# Patient Record
Sex: MALE | Race: BLACK OR AFRICAN AMERICAN | NOT HISPANIC OR LATINO | Employment: FULL TIME | URBAN - METROPOLITAN AREA
[De-identification: names, ages, dates, MRNs, and addresses within clinical notes are randomized per-mention and may not be internally consistent; named-entity substitution may affect disease eponyms.]

---

## 2018-06-07 ENCOUNTER — OFFICE VISIT (OUTPATIENT)
Dept: FAMILY MEDICINE | Facility: CLINIC | Age: 33
End: 2018-06-07
Payer: COMMERCIAL

## 2018-06-07 VITALS
BODY MASS INDEX: 22.83 KG/M2 | SYSTOLIC BLOOD PRESSURE: 106 MMHG | TEMPERATURE: 98 F | HEIGHT: 70 IN | HEART RATE: 51 BPM | DIASTOLIC BLOOD PRESSURE: 66 MMHG | WEIGHT: 159.5 LBS | OXYGEN SATURATION: 97 %

## 2018-06-07 DIAGNOSIS — H61.20 IMPACTED CERUMEN, UNSPECIFIED LATERALITY: Primary | ICD-10-CM

## 2018-06-07 PROCEDURE — 3008F BODY MASS INDEX DOCD: CPT | Mod: CPTII,S$GLB,, | Performed by: FAMILY MEDICINE

## 2018-06-07 PROCEDURE — 99999 PR PBB SHADOW E&M-NEW PATIENT-LVL III: CPT | Mod: PBBFAC,,, | Performed by: FAMILY MEDICINE

## 2018-06-07 PROCEDURE — 99202 OFFICE O/P NEW SF 15 MIN: CPT | Mod: S$GLB,,, | Performed by: FAMILY MEDICINE

## 2018-06-07 NOTE — PROGRESS NOTES
[facility]  Progress Note    SUBJECTIVE:     Chief Complaint   Patient presents with    [facility]    Ear Fullness       HPI   Cale Steiner  is a 32 y.o. male here to [facility] and for c/o left ear fullness. He is having muffled sounds for the past week. No fevers, chills, discharge.     Review of patient's allergies indicates:  No Known Allergies    History reviewed. No pertinent past medical history.  Past Surgical History:   Procedure Laterality Date    thumb surgery       History reviewed. No pertinent family history.  Social History   Substance Use Topics    Smoking status: Never Smoker    Smokeless tobacco: Never Used    Alcohol use No        Review of Systems   Constitutional: Negative for chills and fever.   HENT: Positive for hearing loss (L ear, muffled sound). Negative for ear discharge, ear pain and tinnitus.    Eyes: Negative for blurred vision.   Respiratory: Negative for cough.    Neurological: Negative for headaches.     OBJECTIVE:     Vitals:    06/07/18 0816   BP: 106/66   Pulse: (!) 51   Temp: 98 °F (36.7 °C)     Body mass index is 22.88 kg/m².    Physical Exam   Constitutional: He is oriented to person, place, and time and well-developed, well-nourished, and in no distress. No distress.   HENT:   Head: Normocephalic and atraumatic.   + impacted cerumen in both ears   Eyes: Conjunctivae and EOM are normal.   Cardiovascular: Bradycardia present.    Pulmonary/Chest: Effort normal. No respiratory distress.   Neurological: He is alert and oriented to person, place, and time.   Skin: Skin is warm. He is not diaphoretic.       Old records were reviewed. Labs and/or images were independently reviewed.    ASSESSMENT     1. Impacted cerumen, unspecified laterality        PLAN:     Impacted cerumen, unspecified laterality   -       Irrigated both ears. Complete relief. Will return for physical.    RTC DEMETRICE Santamaira MD  06/07/2018 1:33 PM

## 2018-06-14 ENCOUNTER — TELEPHONE (OUTPATIENT)
Dept: FAMILY MEDICINE | Facility: CLINIC | Age: 33
End: 2018-06-14

## 2018-06-14 DIAGNOSIS — Z23 NEED FOR TDAP VACCINATION: ICD-10-CM

## 2018-06-14 DIAGNOSIS — Z00.00 ROUTINE GENERAL MEDICAL EXAMINATION AT A HEALTH CARE FACILITY: Primary | ICD-10-CM

## 2018-06-14 NOTE — TELEPHONE ENCOUNTER
----- Message from Nayely Shultz sent at 6/14/2018  4:00 PM CDT -----  Contact: self  Pt received a message asking him to schedule fasting labs and Tdap. He states he will complete this when he comes in to appt on 06/21.

## 2018-06-21 ENCOUNTER — OFFICE VISIT (OUTPATIENT)
Dept: FAMILY MEDICINE | Facility: CLINIC | Age: 33
End: 2018-06-21
Payer: COMMERCIAL

## 2018-06-21 ENCOUNTER — LAB VISIT (OUTPATIENT)
Dept: LAB | Facility: HOSPITAL | Age: 33
End: 2018-06-21
Attending: FAMILY MEDICINE
Payer: COMMERCIAL

## 2018-06-21 VITALS
DIASTOLIC BLOOD PRESSURE: 72 MMHG | HEART RATE: 48 BPM | TEMPERATURE: 98 F | SYSTOLIC BLOOD PRESSURE: 122 MMHG | HEIGHT: 70 IN | OXYGEN SATURATION: 97 % | WEIGHT: 160.63 LBS | BODY MASS INDEX: 23 KG/M2

## 2018-06-21 DIAGNOSIS — Z00.00 ROUTINE PHYSICAL EXAMINATION: Primary | ICD-10-CM

## 2018-06-21 DIAGNOSIS — Z00.00 ROUTINE GENERAL MEDICAL EXAMINATION AT A HEALTH CARE FACILITY: ICD-10-CM

## 2018-06-21 DIAGNOSIS — Z00.00 ROUTINE PHYSICAL EXAMINATION: ICD-10-CM

## 2018-06-21 DIAGNOSIS — Z23 NEED FOR TDAP VACCINATION: ICD-10-CM

## 2018-06-21 LAB
ALBUMIN SERPL BCP-MCNC: 4.4 G/DL
ALP SERPL-CCNC: 65 U/L
ALT SERPL W/O P-5'-P-CCNC: 18 U/L
ANION GAP SERPL CALC-SCNC: 7 MMOL/L
AST SERPL-CCNC: 21 U/L
BASOPHILS # BLD AUTO: 0.02 K/UL
BASOPHILS NFR BLD: 0.6 %
BILIRUB SERPL-MCNC: 1.5 MG/DL
BUN SERPL-MCNC: 14 MG/DL
CALCIUM SERPL-MCNC: 9.9 MG/DL
CHLORIDE SERPL-SCNC: 105 MMOL/L
CHOLEST SERPL-MCNC: 180 MG/DL
CHOLEST/HDLC SERPL: 2.6 {RATIO}
CO2 SERPL-SCNC: 28 MMOL/L
CREAT SERPL-MCNC: 1.3 MG/DL
DIFFERENTIAL METHOD: ABNORMAL
EOSINOPHIL # BLD AUTO: 0.2 K/UL
EOSINOPHIL NFR BLD: 5.6 %
ERYTHROCYTE [DISTWIDTH] IN BLOOD BY AUTOMATED COUNT: 12.7 %
EST. GFR  (AFRICAN AMERICAN): >60 ML/MIN/1.73 M^2
EST. GFR  (NON AFRICAN AMERICAN): >60 ML/MIN/1.73 M^2
ESTIMATED AVG GLUCOSE: 108 MG/DL
GLUCOSE SERPL-MCNC: 95 MG/DL
HBA1C MFR BLD HPLC: 5.4 %
HCT VFR BLD AUTO: 47 %
HDLC SERPL-MCNC: 69 MG/DL
HDLC SERPL: 38.3 %
HGB BLD-MCNC: 15.5 G/DL
IMM GRANULOCYTES # BLD AUTO: 0.01 K/UL
IMM GRANULOCYTES NFR BLD AUTO: 0.3 %
LDLC SERPL CALC-MCNC: 101 MG/DL
LYMPHOCYTES # BLD AUTO: 1.5 K/UL
LYMPHOCYTES NFR BLD: 41.7 %
MCH RBC QN AUTO: 30.9 PG
MCHC RBC AUTO-ENTMCNC: 33 G/DL
MCV RBC AUTO: 94 FL
MONOCYTES # BLD AUTO: 0.4 K/UL
MONOCYTES NFR BLD: 11.3 %
NEUTROPHILS # BLD AUTO: 1.4 K/UL
NEUTROPHILS NFR BLD: 40.5 %
NONHDLC SERPL-MCNC: 111 MG/DL
NRBC BLD-RTO: 0 /100 WBC
PLATELET # BLD AUTO: 233 K/UL
PMV BLD AUTO: 10.6 FL
POTASSIUM SERPL-SCNC: 4.2 MMOL/L
PROT SERPL-MCNC: 7.5 G/DL
RBC # BLD AUTO: 5.02 M/UL
SODIUM SERPL-SCNC: 140 MMOL/L
T4 FREE SERPL-MCNC: 0.97 NG/DL
TRIGL SERPL-MCNC: 50 MG/DL
TSH SERPL DL<=0.005 MIU/L-ACNC: 1.89 UIU/ML
WBC # BLD AUTO: 3.55 K/UL

## 2018-06-21 PROCEDURE — 80061 LIPID PANEL: CPT

## 2018-06-21 PROCEDURE — 99999 PR PBB SHADOW E&M-EST. PATIENT-LVL III: CPT | Mod: PBBFAC,,, | Performed by: FAMILY MEDICINE

## 2018-06-21 PROCEDURE — 84439 ASSAY OF FREE THYROXINE: CPT

## 2018-06-21 PROCEDURE — 80053 COMPREHEN METABOLIC PANEL: CPT

## 2018-06-21 PROCEDURE — 99395 PREV VISIT EST AGE 18-39: CPT | Mod: 25,S$GLB,, | Performed by: FAMILY MEDICINE

## 2018-06-21 PROCEDURE — 86703 HIV-1/HIV-2 1 RESULT ANTBDY: CPT

## 2018-06-21 PROCEDURE — 80074 ACUTE HEPATITIS PANEL: CPT

## 2018-06-21 PROCEDURE — 36415 COLL VENOUS BLD VENIPUNCTURE: CPT | Mod: PO

## 2018-06-21 PROCEDURE — 84443 ASSAY THYROID STIM HORMONE: CPT

## 2018-06-21 PROCEDURE — 85025 COMPLETE CBC W/AUTO DIFF WBC: CPT

## 2018-06-21 PROCEDURE — 83036 HEMOGLOBIN GLYCOSYLATED A1C: CPT

## 2018-06-21 PROCEDURE — 90715 TDAP VACCINE 7 YRS/> IM: CPT | Mod: S$GLB,,, | Performed by: FAMILY MEDICINE

## 2018-06-21 PROCEDURE — 90471 IMMUNIZATION ADMIN: CPT | Mod: S$GLB,,, | Performed by: FAMILY MEDICINE

## 2018-06-21 NOTE — PROGRESS NOTES
[facility]  Progress Note    SUBJECTIVE:     Chief Complaint   Patient presents with    Annual Exam       HPI   Cale Steiner  is a 32 y.o. male here for annual exam. Patient has no other new complaints/problems at this time.      Review of patient's allergies indicates:  No Known Allergies    History reviewed. No pertinent past medical history.  Past Surgical History:   Procedure Laterality Date    thumb surgery       History reviewed. No pertinent family history.  Social History   Substance Use Topics    Smoking status: Never Smoker    Smokeless tobacco: Never Used    Alcohol use No        Review of Systems   Constitutional: Negative for chills, diaphoresis and fever.   HENT: Negative for congestion, ear pain and sore throat.    Eyes: Negative for photophobia and discharge.   Respiratory: Negative for cough, shortness of breath and wheezing.    Cardiovascular: Negative for chest pain and palpitations.   Gastrointestinal: Negative for abdominal pain, constipation, diarrhea, nausea and vomiting.   Genitourinary: Negative for dysuria and hematuria.   Musculoskeletal: Negative for back pain and myalgias.   Skin: Negative for itching and rash.   Neurological: Negative for dizziness, sensory change, focal weakness, weakness and headaches.   All other systems reviewed and are negative.    OBJECTIVE:     Vitals:    06/21/18 0852   BP: 122/72   Pulse: (!) 48   Temp: 97.9 °F (36.6 °C)     Body mass index is 23.04 kg/m².    Physical Exam   Constitutional: He is oriented to person, place, and time and well-developed, well-nourished, and in no distress. No distress.   HENT:   Head: Normocephalic and atraumatic.   Right Ear: Tympanic membrane is not perforated, not erythematous and not bulging. No hemotympanum.   Left Ear: Tympanic membrane is not perforated, not erythematous and not bulging. No hemotympanum.   Mouth/Throat: Oropharynx is clear and moist. No oropharyngeal exudate.   Eyes: Conjunctivae and EOM  are normal. Pupils are equal, round, and reactive to light.   Neck: No thyromegaly present.   Cardiovascular: Normal rate, regular rhythm and normal heart sounds.  Exam reveals no gallop and no friction rub.    No murmur heard.  Pulmonary/Chest: Effort normal and breath sounds normal. No respiratory distress. He has no wheezes. He has no rales.   Abdominal: Soft. Bowel sounds are normal. He exhibits no distension. There is no tenderness. There is no rebound and no guarding.   Musculoskeletal: Normal range of motion. He exhibits no edema or tenderness.   Lymphadenopathy:     He has no cervical adenopathy.   Neurological: He is alert and oriented to person, place, and time.   Skin: Skin is warm. No rash noted. He is not diaphoretic. No erythema.       Old records were reviewed. Labs and/or images were independently reviewed.    ASSESSMENT     1. Routine physical examination    2. Need for Tdap vaccination        PLAN:     Routine physical examination  -     CBC auto differential; Future  -     Cancel: Comprehensive metabolic panel; Future  -     Hemoglobin A1c; Future  -     Cancel: Lipid panel; Future  -     TSH; Future  -     T4, free; Future  -     HIV-1 and HIV-2 antibodies; Future; Expected date: 06/21/2018  -     Hepatitis panel, acute; Future; Expected date: 06/21/2018  -     We briefly discussed diet, exercise, and routine preventive exams. All questions and comments addressed.    Need for Tdap vaccination  -     Tdap Vaccine      RTC PRSANDY Santamaria MD  06/21/2018 9:08 AM

## 2018-06-21 NOTE — PROGRESS NOTES
Tdap vaccine administered. Tolerated well, instructed to wait 15 min for observation. No reaction noted at discharged.

## 2018-06-22 LAB
HAV IGM SERPL QL IA: NEGATIVE
HBV CORE IGM SERPL QL IA: NEGATIVE
HBV SURFACE AG SERPL QL IA: NEGATIVE
HCV AB SERPL QL IA: NEGATIVE
HIV 1+2 AB+HIV1 P24 AG SERPL QL IA: NEGATIVE

## 2018-06-24 NOTE — PROGRESS NOTES
Labs nl WBC mild low suspect this is genetic.  STD negative HIV negative, viral hep negative  Results to pt via [facility].

## 2020-08-06 ENCOUNTER — OFFICE VISIT (OUTPATIENT)
Dept: FAMILY MEDICINE | Facility: CLINIC | Age: 35
End: 2020-08-06
Payer: COMMERCIAL

## 2020-08-06 VITALS
BODY MASS INDEX: 22.15 KG/M2 | TEMPERATURE: 98 F | SYSTOLIC BLOOD PRESSURE: 110 MMHG | DIASTOLIC BLOOD PRESSURE: 72 MMHG | OXYGEN SATURATION: 97 % | HEIGHT: 70 IN | WEIGHT: 154.75 LBS | HEART RATE: 64 BPM

## 2020-08-06 DIAGNOSIS — H61.20 IMPACTED CERUMEN, UNSPECIFIED LATERALITY: Primary | ICD-10-CM

## 2020-08-06 PROCEDURE — 99214 PR OFFICE/OUTPT VISIT, EST, LEVL IV, 30-39 MIN: ICD-10-PCS | Mod: S$GLB,,, | Performed by: NURSE PRACTITIONER

## 2020-08-06 PROCEDURE — 99999 PR PBB SHADOW E&M-EST. PATIENT-LVL III: ICD-10-PCS | Mod: PBBFAC,,, | Performed by: NURSE PRACTITIONER

## 2020-08-06 PROCEDURE — 3008F BODY MASS INDEX DOCD: CPT | Mod: CPTII,S$GLB,, | Performed by: NURSE PRACTITIONER

## 2020-08-06 PROCEDURE — 3008F PR BODY MASS INDEX (BMI) DOCUMENTED: ICD-10-PCS | Mod: CPTII,S$GLB,, | Performed by: NURSE PRACTITIONER

## 2020-08-06 PROCEDURE — 99999 PR PBB SHADOW E&M-EST. PATIENT-LVL III: CPT | Mod: PBBFAC,,, | Performed by: NURSE PRACTITIONER

## 2020-08-06 PROCEDURE — 99214 OFFICE O/P EST MOD 30 MIN: CPT | Mod: S$GLB,,, | Performed by: NURSE PRACTITIONER

## 2020-08-06 NOTE — PROGRESS NOTES
Subjective:       Patient ID: Cale Steiner is a 34 y.o. male.    Chief Complaint: Cerumen Impaction    34-year-old male presents to the clinic today with complaint of buildup of wax in both ear canals.  He has tried over-the-counter drops without any relief.  He says it is a chronic problem he has.  He denies any other complaints today.    No past medical history on file.  Past Surgical History:   Procedure Laterality Date    thumb surgery        reports that he has never smoked. He has never used smokeless tobacco. He reports that he does not drink alcohol or use drugs.  Review of Systems   HENT:        Build up of wax both ear canals    Respiratory: Negative for cough, shortness of breath and wheezing.    Cardiovascular: Negative for chest pain, palpitations and leg swelling.   Gastrointestinal: Negative for abdominal pain, blood in stool, constipation, diarrhea, nausea and vomiting.   Musculoskeletal: Negative for gait problem.   Skin: Negative for rash.   Neurological: Negative for dizziness, light-headedness and headaches.       Objective:      Physical Exam  Constitutional:       General: He is not in acute distress.     Appearance: He is well-developed. He is not diaphoretic.   HENT:      Right Ear: There is impacted cerumen.      Left Ear: There is impacted cerumen.   Eyes:      General: No scleral icterus.        Right eye: No discharge.         Left eye: No discharge.      Conjunctiva/sclera: Conjunctivae normal.      Pupils: Pupils are equal, round, and reactive to light.   Neck:      Musculoskeletal: Normal range of motion and neck supple.      Vascular: No JVD.   Cardiovascular:      Rate and Rhythm: Normal rate and regular rhythm.      Heart sounds: Normal heart sounds. No murmur.   Pulmonary:      Effort: Pulmonary effort is normal. No respiratory distress.      Breath sounds: Normal breath sounds. No wheezing or rales.   Musculoskeletal: Normal range of motion.   Skin:     General: Skin is warm  and dry.   Neurological:      Mental Status: He is alert and oriented to person, place, and time.         Assessment:       1. Impacted cerumen, unspecified laterality        Plan:         Impacted cerumen, unspecified laterality  - after both ears washed out both TM clear with good light reflex no evidence of any further wax, no redness, no ruptured TM

## 2020-09-17 ENCOUNTER — TELEPHONE (OUTPATIENT)
Dept: FAMILY MEDICINE | Facility: CLINIC | Age: 35
End: 2020-09-17

## 2020-09-17 ENCOUNTER — OFFICE VISIT (OUTPATIENT)
Dept: FAMILY MEDICINE | Facility: CLINIC | Age: 35
End: 2020-09-17
Payer: COMMERCIAL

## 2020-09-17 VITALS
DIASTOLIC BLOOD PRESSURE: 60 MMHG | OXYGEN SATURATION: 98 % | BODY MASS INDEX: 21.9 KG/M2 | WEIGHT: 156.44 LBS | HEART RATE: 57 BPM | TEMPERATURE: 98 F | SYSTOLIC BLOOD PRESSURE: 110 MMHG | HEIGHT: 71 IN

## 2020-09-17 DIAGNOSIS — Z00.00 ROUTINE PHYSICAL EXAMINATION: Primary | ICD-10-CM

## 2020-09-17 DIAGNOSIS — Z23 NEED FOR IMMUNIZATION AGAINST INFLUENZA: ICD-10-CM

## 2020-09-17 PROCEDURE — 99395 PR PREVENTIVE VISIT,EST,18-39: ICD-10-PCS | Mod: 25,S$GLB,, | Performed by: FAMILY MEDICINE

## 2020-09-17 PROCEDURE — 99999 PR PBB SHADOW E&M-EST. PATIENT-LVL III: CPT | Mod: PBBFAC,,, | Performed by: FAMILY MEDICINE

## 2020-09-17 PROCEDURE — 90471 FLU VACCINE (QUAD) GREATER THAN OR EQUAL TO 3YO PRESERVATIVE FREE IM: ICD-10-PCS | Mod: S$GLB,,, | Performed by: FAMILY MEDICINE

## 2020-09-17 PROCEDURE — 90471 IMMUNIZATION ADMIN: CPT | Mod: S$GLB,,, | Performed by: FAMILY MEDICINE

## 2020-09-17 PROCEDURE — 99999 PR PBB SHADOW E&M-EST. PATIENT-LVL III: ICD-10-PCS | Mod: PBBFAC,,, | Performed by: FAMILY MEDICINE

## 2020-09-17 PROCEDURE — 90686 FLU VACCINE (QUAD) GREATER THAN OR EQUAL TO 3YO PRESERVATIVE FREE IM: ICD-10-PCS | Mod: S$GLB,,, | Performed by: FAMILY MEDICINE

## 2020-09-17 PROCEDURE — 90686 IIV4 VACC NO PRSV 0.5 ML IM: CPT | Mod: S$GLB,,, | Performed by: FAMILY MEDICINE

## 2020-09-17 PROCEDURE — 99395 PREV VISIT EST AGE 18-39: CPT | Mod: 25,S$GLB,, | Performed by: FAMILY MEDICINE

## 2020-09-17 NOTE — PROGRESS NOTES
[facility]  Progress Note    SUBJECTIVE:     Chief Complaint   Patient presents with    Annual Exam       HPI   Cale Steiner  is a 34 y.o. male here for routine physical exam. Patient has no other new complaints/problems at this time.      Review of patient's allergies indicates:  No Known Allergies    History reviewed. No pertinent past medical history.  Past Surgical History:   Procedure Laterality Date    thumb surgery       Family History   Problem Relation Age of Onset    Multiple myeloma Mother      Social History     Tobacco Use    Smoking status: Never Smoker    Smokeless tobacco: Never Used   Substance Use Topics    Alcohol use: No    Drug use: No        Review of Systems   Constitutional: Negative for chills, diaphoresis and fever.   HENT: Negative for congestion, ear pain and sore throat.    Eyes: Negative for photophobia and discharge.   Respiratory: Negative for cough, shortness of breath and wheezing.    Cardiovascular: Negative for chest pain and palpitations.   Gastrointestinal: Negative for abdominal pain, constipation, diarrhea, nausea and vomiting.   Genitourinary: Negative for dysuria and hematuria.   Musculoskeletal: Negative for back pain and myalgias.   Skin: Negative for itching and rash.   Neurological: Negative for dizziness, sensory change, focal weakness, weakness and headaches.   All other systems reviewed and are negative.    OBJECTIVE:     Vitals:    09/17/20 1502   BP: 110/60   Pulse: (!) 57   Temp: 98.2 °F (36.8 °C)     Body mass index is 21.82 kg/m².    Physical Exam   Constitutional: He is oriented to person, place, and time and well-developed, well-nourished, and in no distress. No distress.   HENT:   Head: Normocephalic and atraumatic.   Right Ear: Tympanic membrane is not perforated, not erythematous and not bulging. No hemotympanum.   Left Ear: Tympanic membrane is not perforated, not erythematous and not bulging. No hemotympanum.   Mouth/Throat: Oropharynx  is clear and moist. No oropharyngeal exudate.   Eyes: Pupils are equal, round, and reactive to light. Conjunctivae and EOM are normal.   Neck: No thyromegaly present.   Cardiovascular: Normal rate, regular rhythm and normal heart sounds. Exam reveals no gallop and no friction rub.   No murmur heard.  Pulmonary/Chest: Effort normal and breath sounds normal. No respiratory distress. He has no wheezes. He has no rales.   Abdominal: Soft. Bowel sounds are normal. He exhibits no distension. There is no abdominal tenderness. There is no rebound and no guarding.   Musculoskeletal: Normal range of motion.         General: No tenderness or edema.   Lymphadenopathy:     He has no cervical adenopathy.   Neurological: He is alert and oriented to person, place, and time.   Skin: Skin is warm. No rash noted. He is not diaphoretic. No erythema.       Old records were reviewed. Labs and/or images were independently reviewed.    ASSESSMENT     1. Routine physical examination    2. Need for immunization against influenza        PLAN:     Routine physical examination  -     CBC auto differential; Future  -     Comprehensive metabolic panel; Future  -     Hemoglobin A1C; Future  -     Lipid Panel; Future  -     TSH; Future  -     T4, free; Future  -     We briefly discussed diet, exercise, and routine preventive exams. All questions and comments addressed.    Need for immunization against influenza  -     Influenza - Quadrivalent (PF)      RTC PRN    Wild Santamaria MD  09/17/2020 3:11 PM

## 2020-09-17 NOTE — TELEPHONE ENCOUNTER
----- Message from Danny Winter sent at 9/17/2020 10:49 AM CDT -----  Regarding: Orders  Contact: Patient  Type: Patient Call Back    Who called: Patient    What is the request in detail: Patient is request full panel labs for physical. Please advise.    Can the clinic reply by [facility]? No    Would the patient rather a call back or a response via [facility]? Call    Best call back number: [phone] (home)     Additional Information: n/a

## 2020-10-14 ENCOUNTER — TELEPHONE (OUTPATIENT)
Dept: OPTOMETRY | Facility: CLINIC | Age: 35
End: 2020-10-14

## 2020-10-14 NOTE — TELEPHONE ENCOUNTER
[facility] ins benefits as of 10/15/20:    Member: PRAVEEN HAYWARD  YOB: 1985  Subscriber ID: [id number]  Product Name:  - SVP  Plan Code: YK  Please Note: Member must be eligible at date of service to receive benefit.  In Network Coverage Frequency  Category Benefit Eligibility Frequency  Exam Available 1 every 2 plan year(s)  Non-Selection Contact Lens Fit Available Every 12 month(s)  Frame Available Every 12 month(s)  Lenses Available Every 12 month(s)  Non-Selection Contact Lenses Available Every 12 month(s)   Dilated Fundus Exam: Patient History Currently Unavailable  In Network Coverage  [employer] Patient Responsibility  (includes applicable copay)  Professional Services  Exam $25.00  Non-Selection Contact Lens Fit 80% of Billed Charges  Frames  Frame 70% of Billed Charges  Lenses  Lenses / All BF Lenses $65.00  Lenses / All SV Lenses $45.00  Lenses / All TF Lenses $95.00  Lenses / Blended Bifocals 80% of Billed Charges  Lenses / Free-form SV Lenses 80% of Billed Charges  Lenses / MF Aspheric Lenses 80% of Billed Charges  Lenses / Occupational Double Seg Lenses 80% of Billed Charges  Lenses / Progressive Lenses: Tier 1 (Standard) $135.00  Lenses / Progressive Lenses: Tier 2 (Deluxe) $175.00  Lenses / Progressive Lenses: Tier 3 (Premium) $215.00  Lenses / Progressive Lenses: Tier 4 (Platinum) $315.00  Lenses / Progressive Lenses: Tier 5 (Non-formulary) 80% of Billed Charges  Lenses / SV Aspheric Lenses 80% of Billed Charges  Lens Materials  (Pricing shown is in addition to Patient Responsibility from Lens section above)  High Index 1.66 - 1.73 $63.00  High Index less than or equal to 1.66 $53.00  Rendered: 10/14/2020 2:41 PM Page 1 of 2  High Index, >= 1.74 80% of Billed Charges  Polycarbonate Lenses $33.00  Lens Options  Edge Coating 80% of Billed Charges  Miscellaneous Lens Options 80% of Billed Charges  Non-Formulary Anti-Reflective Coating 80% of Billed Charges  One Year  Scratch Warranty $10.00  Oversize Lenses 80% of Billed Charges  Photochromic $67.00  Platinum Anti-Reflective Coating $90.00  Polarized 80% of Billed Charges  Polished Edges / Roll & Polish $13.00  Premium Anti-Reflective Coating $80.00  Scratch Coating Covered-in-Full  Standard Anti-Reflective Coating $40.00  Tint $14.00  UV Coating $16.00  All other lens options - 20% off Provider's retail price  Contact Lenses  Non-Selection Contact Lenses 80% of Billed Charges  Your allowance above is the total amount available per benefit year and is applied toward the purchase of contact lenses.  The material copay does not apply. If your contacts are greater than the allowance, then you are only responsible for the  difference.

## 2020-10-15 ENCOUNTER — OFFICE VISIT (OUTPATIENT)
Dept: OPTOMETRY | Facility: CLINIC | Age: 35
End: 2020-10-15
Payer: COMMERCIAL

## 2020-10-15 ENCOUNTER — LAB VISIT (OUTPATIENT)
Dept: LAB | Facility: HOSPITAL | Age: 35
End: 2020-10-15
Attending: FAMILY MEDICINE
Payer: COMMERCIAL

## 2020-10-15 DIAGNOSIS — Z00.00 ROUTINE PHYSICAL EXAMINATION: ICD-10-CM

## 2020-10-15 DIAGNOSIS — H52.7 REFRACTIVE ERROR: ICD-10-CM

## 2020-10-15 DIAGNOSIS — Z01.00 ROUTINE EYE EXAM: Primary | ICD-10-CM

## 2020-10-15 LAB
ALBUMIN SERPL BCP-MCNC: 4.5 G/DL (ref 3.5–5.2)
ALP SERPL-CCNC: 60 U/L (ref 55–135)
ALT SERPL W/O P-5'-P-CCNC: 27 U/L (ref 10–44)
ANION GAP SERPL CALC-SCNC: 6 MMOL/L (ref 8–16)
AST SERPL-CCNC: 28 U/L (ref 10–40)
BASOPHILS # BLD AUTO: 0.02 K/UL (ref 0–0.2)
BASOPHILS NFR BLD: 0.6 % (ref 0–1.9)
BILIRUB SERPL-MCNC: 0.8 MG/DL (ref 0.1–1)
BUN SERPL-MCNC: 14 MG/DL (ref 6–20)
CALCIUM SERPL-MCNC: 9.7 MG/DL (ref 8.7–10.5)
CHLORIDE SERPL-SCNC: 105 MMOL/L (ref 95–110)
CHOLEST SERPL-MCNC: 185 MG/DL (ref 120–199)
CHOLEST/HDLC SERPL: 2.6 {RATIO} (ref 2–5)
CO2 SERPL-SCNC: 31 MMOL/L (ref 23–29)
CREAT SERPL-MCNC: 1.1 MG/DL (ref 0.5–1.4)
DIFFERENTIAL METHOD: ABNORMAL
EOSINOPHIL # BLD AUTO: 0.1 K/UL (ref 0–0.5)
EOSINOPHIL NFR BLD: 4.4 % (ref 0–8)
ERYTHROCYTE [DISTWIDTH] IN BLOOD BY AUTOMATED COUNT: 12.3 % (ref 11.5–14.5)
EST. GFR  (AFRICAN AMERICAN): >60 ML/MIN/1.73 M^2
EST. GFR  (NON AFRICAN AMERICAN): >60 ML/MIN/1.73 M^2
ESTIMATED AVG GLUCOSE: 111 MG/DL (ref 68–131)
GLUCOSE SERPL-MCNC: 91 MG/DL (ref 70–110)
HBA1C MFR BLD HPLC: 5.5 % (ref 4–5.6)
HCT VFR BLD AUTO: 48.7 % (ref 40–54)
HDLC SERPL-MCNC: 72 MG/DL (ref 40–75)
HDLC SERPL: 38.9 % (ref 20–50)
HGB BLD-MCNC: 15.5 G/DL (ref 14–18)
IMM GRANULOCYTES # BLD AUTO: 0 K/UL (ref 0–0.04)
IMM GRANULOCYTES NFR BLD AUTO: 0 % (ref 0–0.5)
LDLC SERPL CALC-MCNC: 104.2 MG/DL (ref 63–159)
LYMPHOCYTES # BLD AUTO: 1.4 K/UL (ref 1–4.8)
LYMPHOCYTES NFR BLD: 44.1 % (ref 18–48)
MCH RBC QN AUTO: 30.3 PG (ref 27–31)
MCHC RBC AUTO-ENTMCNC: 31.8 G/DL (ref 32–36)
MCV RBC AUTO: 95 FL (ref 82–98)
MONOCYTES # BLD AUTO: 0.3 K/UL (ref 0.3–1)
MONOCYTES NFR BLD: 10.8 % (ref 4–15)
NEUTROPHILS # BLD AUTO: 1.3 K/UL (ref 1.8–7.7)
NEUTROPHILS NFR BLD: 40.1 % (ref 38–73)
NONHDLC SERPL-MCNC: 113 MG/DL
NRBC BLD-RTO: 0 /100 WBC
PLATELET # BLD AUTO: 237 K/UL (ref 150–350)
PMV BLD AUTO: 10.7 FL (ref 9.2–12.9)
POTASSIUM SERPL-SCNC: 4.1 MMOL/L (ref 3.5–5.1)
PROT SERPL-MCNC: 7.4 G/DL (ref 6–8.4)
RBC # BLD AUTO: 5.12 M/UL (ref 4.6–6.2)
SODIUM SERPL-SCNC: 142 MMOL/L (ref 136–145)
T4 FREE SERPL-MCNC: 0.98 NG/DL (ref 0.71–1.51)
TRIGL SERPL-MCNC: 44 MG/DL (ref 30–150)
TSH SERPL DL<=0.005 MIU/L-ACNC: 1.29 UIU/ML (ref 0.4–4)
WBC # BLD AUTO: 3.15 K/UL (ref 3.9–12.7)

## 2020-10-15 PROCEDURE — 92004 COMPRE OPH EXAM NEW PT 1/>: CPT | Mod: S$GLB,,, | Performed by: OPTOMETRIST

## 2020-10-15 PROCEDURE — 80053 COMPREHEN METABOLIC PANEL: CPT

## 2020-10-15 PROCEDURE — 80061 LIPID PANEL: CPT

## 2020-10-15 PROCEDURE — 92004 PR EYE EXAM, NEW PATIENT,COMPREHESV: ICD-10-PCS | Mod: S$GLB,,, | Performed by: OPTOMETRIST

## 2020-10-15 PROCEDURE — 84443 ASSAY THYROID STIM HORMONE: CPT

## 2020-10-15 PROCEDURE — 85025 COMPLETE CBC W/AUTO DIFF WBC: CPT

## 2020-10-15 PROCEDURE — 92015 PR REFRACTION: ICD-10-PCS | Mod: S$GLB,,, | Performed by: OPTOMETRIST

## 2020-10-15 PROCEDURE — 92015 DETERMINE REFRACTIVE STATE: CPT | Mod: S$GLB,,, | Performed by: OPTOMETRIST

## 2020-10-15 PROCEDURE — 84439 ASSAY OF FREE THYROXINE: CPT

## 2020-10-15 PROCEDURE — 99999 PR PBB SHADOW E&M-EST. PATIENT-LVL II: CPT | Mod: PBBFAC,,, | Performed by: OPTOMETRIST

## 2020-10-15 PROCEDURE — 99999 PR PBB SHADOW E&M-EST. PATIENT-LVL II: ICD-10-PCS | Mod: PBBFAC,,, | Performed by: OPTOMETRIST

## 2020-10-15 PROCEDURE — 36415 COLL VENOUS BLD VENIPUNCTURE: CPT | Mod: PO

## 2020-10-15 PROCEDURE — 83036 HEMOGLOBIN GLYCOSYLATED A1C: CPT

## 2020-10-15 NOTE — PROGRESS NOTES
Subjective:       Patient ID: Cale Steiner is a 34 y.o. male      Chief Complaint   Patient presents with    Concerns About Ocular Health     History of Present Illness  Dls: ? Yrs     33 y/o male presents today with c/o blurry vision at distance ou.   Pt does not wear any glasses.     No tearing  No itching  No burning  No pain  No ha's  No floaters  No flashes    Eye meds  None       Assessment/Plan:     1. Routine eye exam  Spectera vision      2. Refractive error  Educated patient on refractive error and discussed lens options. Dispensed updated spectacle Rx. Educated about adaptation period to new specs.    Eyeglass Final Rx     Eyeglass Final Rx       Sphere Cylinder Axis    Right -0.75 +1.25 065    Left -0.75 +1.00 125    Type: SVL    Expiration Date: 10/16/2021

## 2020-10-15 NOTE — LETTER
October 15, 2020      Wild Santamaria MD  [address]  [place] [address]           [facility]  [address]  [place] [address]  Phone: [phone]  Fax: [phone]          Patient: Cale Steiner   MR Number: [id number]   YOB: 1985   Date of Visit: 10/15/2020       Dear Dr. Wild Santamaria:    Thank you for referring Cale Steiner to me for evaluation. Attached you will find relevant portions of my assessment and plan of care.    If you have questions, please do not hesitate to call me. I look forward to following Cale Steiner along with you.    Sincerely,    Maddie Chahal, OD    Enclosure  CC:  No Recipients    If you would like to receive this communication electronically, please contact [email or web address] or [phone] to request more information on [employer] access.    For providers and/or their staff who would like to refer a patient to [facility], please contact us through our one-stop-shop provider referral line, [facility], at [phone].    If you feel you have received this communication in error or would no longer like to receive these types of communications, please e-mail [email or web address]

## 2021-04-15 ENCOUNTER — PATIENT MESSAGE (OUTPATIENT)
Dept: RESEARCH | Facility: HOSPITAL | Age: 36
End: 2021-04-15

## 2021-10-24 ENCOUNTER — PATIENT MESSAGE (OUTPATIENT)
Dept: FAMILY MEDICINE | Facility: CLINIC | Age: 36
End: 2021-10-24
Payer: COMMERCIAL

## 2021-10-27 ENCOUNTER — OFFICE VISIT (OUTPATIENT)
Dept: FAMILY MEDICINE | Facility: CLINIC | Age: 36
End: 2021-10-27
Payer: COMMERCIAL

## 2021-10-27 VITALS
RESPIRATION RATE: 18 BRPM | OXYGEN SATURATION: 97 % | HEIGHT: 71 IN | BODY MASS INDEX: 22.33 KG/M2 | TEMPERATURE: 98 F | WEIGHT: 159.5 LBS | HEART RATE: 69 BPM | SYSTOLIC BLOOD PRESSURE: 112 MMHG | DIASTOLIC BLOOD PRESSURE: 62 MMHG

## 2021-10-27 DIAGNOSIS — M54.32 LEFT SCIATIC NERVE PAIN: Primary | ICD-10-CM

## 2021-10-27 PROCEDURE — 99214 PR OFFICE/OUTPT VISIT, EST, LEVL IV, 30-39 MIN: ICD-10-PCS | Mod: S$GLB,,, | Performed by: NURSE PRACTITIONER

## 2021-10-27 PROCEDURE — 99999 PR PBB SHADOW E&M-EST. PATIENT-LVL V: ICD-10-PCS | Mod: PBBFAC,,, | Performed by: NURSE PRACTITIONER

## 2021-10-27 PROCEDURE — 99214 OFFICE O/P EST MOD 30 MIN: CPT | Mod: S$GLB,,, | Performed by: NURSE PRACTITIONER

## 2021-10-27 PROCEDURE — 99215 OFFICE O/P EST HI 40 MIN: CPT | Mod: PBBFAC,PO | Performed by: NURSE PRACTITIONER

## 2021-10-27 PROCEDURE — 99999 PR PBB SHADOW E&M-EST. PATIENT-LVL V: CPT | Mod: PBBFAC,,, | Performed by: NURSE PRACTITIONER

## 2021-10-27 RX ORDER — MELOXICAM 15 MG/1
15 TABLET ORAL DAILY
Qty: 30 TABLET | Refills: 0 | Status: SHIPPED | OUTPATIENT
Start: 2021-10-27 | End: 2022-01-06

## 2021-10-27 RX ORDER — PREDNISONE 20 MG/1
20 TABLET ORAL 2 TIMES DAILY
Qty: 10 TABLET | Refills: 0 | Status: SHIPPED | OUTPATIENT
Start: 2021-10-27 | End: 2021-11-01

## 2021-11-03 ENCOUNTER — OFFICE VISIT (OUTPATIENT)
Dept: PAIN MEDICINE | Facility: CLINIC | Age: 36
End: 2021-11-03
Payer: COMMERCIAL

## 2021-11-03 VITALS
TEMPERATURE: 98 F | BODY MASS INDEX: 22.28 KG/M2 | WEIGHT: 159.13 LBS | HEIGHT: 71 IN | HEART RATE: 68 BPM | SYSTOLIC BLOOD PRESSURE: 127 MMHG | DIASTOLIC BLOOD PRESSURE: 66 MMHG | OXYGEN SATURATION: 97 %

## 2021-11-03 DIAGNOSIS — M47.816 LUMBAR SPONDYLOSIS: ICD-10-CM

## 2021-11-03 DIAGNOSIS — M54.32 LEFT SCIATIC NERVE PAIN: ICD-10-CM

## 2021-11-03 DIAGNOSIS — M51.36 DDD (DEGENERATIVE DISC DISEASE), LUMBAR: Primary | ICD-10-CM

## 2021-11-03 DIAGNOSIS — M54.16 LUMBAR RADICULOPATHY: ICD-10-CM

## 2021-11-03 PROBLEM — M51.369 DDD (DEGENERATIVE DISC DISEASE), LUMBAR: Status: ACTIVE | Noted: 2021-11-03

## 2021-11-03 PROCEDURE — 99999 PR PBB SHADOW E&M-EST. PATIENT-LVL V: CPT | Mod: PBBFAC,,, | Performed by: PAIN MEDICINE

## 2021-11-03 PROCEDURE — 99204 OFFICE O/P NEW MOD 45 MIN: CPT | Mod: ,,, | Performed by: PAIN MEDICINE

## 2021-11-03 PROCEDURE — 99999 PR PBB SHADOW E&M-EST. PATIENT-LVL V: ICD-10-PCS | Mod: PBBFAC,,, | Performed by: PAIN MEDICINE

## 2021-11-03 PROCEDURE — 99204 PR OFFICE/OUTPT VISIT, NEW, LEVL IV, 45-59 MIN: ICD-10-PCS | Mod: ,,, | Performed by: PAIN MEDICINE

## 2021-11-03 RX ORDER — GABAPENTIN 300 MG/1
600 CAPSULE ORAL 3 TIMES DAILY
Qty: 180 CAPSULE | Refills: 5 | Status: SHIPPED | OUTPATIENT
Start: 2021-11-03 | End: 2022-05-31

## 2021-11-22 ENCOUNTER — CLINICAL SUPPORT (OUTPATIENT)
Dept: REHABILITATION | Facility: HOSPITAL | Age: 36
End: 2021-11-22
Attending: PAIN MEDICINE
Payer: COMMERCIAL

## 2021-11-22 DIAGNOSIS — M54.16 LUMBAR RADICULOPATHY: ICD-10-CM

## 2021-11-22 DIAGNOSIS — R26.2 DIFFICULTY WALKING: ICD-10-CM

## 2021-11-22 DIAGNOSIS — M47.816 LUMBAR SPONDYLOSIS: ICD-10-CM

## 2021-11-22 DIAGNOSIS — R29.898 WEAKNESS OF LEFT LOWER EXTREMITY: ICD-10-CM

## 2021-11-22 DIAGNOSIS — M54.42 BILATERAL LOW BACK PAIN WITH LEFT-SIDED SCIATICA, UNSPECIFIED CHRONICITY: ICD-10-CM

## 2021-11-22 DIAGNOSIS — M51.36 DDD (DEGENERATIVE DISC DISEASE), LUMBAR: ICD-10-CM

## 2021-11-22 PROCEDURE — 97140 MANUAL THERAPY 1/> REGIONS: CPT | Mod: PN

## 2021-11-22 PROCEDURE — 97161 PT EVAL LOW COMPLEX 20 MIN: CPT | Mod: PN

## 2021-11-24 ENCOUNTER — CLINICAL SUPPORT (OUTPATIENT)
Dept: REHABILITATION | Facility: HOSPITAL | Age: 36
End: 2021-11-24
Attending: PAIN MEDICINE

## 2021-11-24 DIAGNOSIS — R29.898 WEAKNESS OF LEFT LOWER EXTREMITY: ICD-10-CM

## 2021-11-24 DIAGNOSIS — M54.42 BILATERAL LOW BACK PAIN WITH LEFT-SIDED SCIATICA, UNSPECIFIED CHRONICITY: ICD-10-CM

## 2021-11-24 DIAGNOSIS — R26.2 DIFFICULTY WALKING: ICD-10-CM

## 2021-11-24 PROCEDURE — 97110 THERAPEUTIC EXERCISES: CPT | Mod: PN

## 2021-11-24 PROCEDURE — 97140 MANUAL THERAPY 1/> REGIONS: CPT | Mod: PN

## 2021-12-06 ENCOUNTER — CLINICAL SUPPORT (OUTPATIENT)
Dept: REHABILITATION | Facility: HOSPITAL | Age: 36
End: 2021-12-06
Attending: PAIN MEDICINE
Payer: COMMERCIAL

## 2021-12-06 DIAGNOSIS — R26.2 DIFFICULTY WALKING: ICD-10-CM

## 2021-12-06 DIAGNOSIS — R29.898 WEAKNESS OF LEFT LOWER EXTREMITY: ICD-10-CM

## 2021-12-06 PROCEDURE — 97110 THERAPEUTIC EXERCISES: CPT | Mod: PN,CQ

## 2021-12-06 PROCEDURE — 97140 MANUAL THERAPY 1/> REGIONS: CPT | Mod: PN,CQ

## 2021-12-08 ENCOUNTER — PATIENT MESSAGE (OUTPATIENT)
Dept: PAIN MEDICINE | Facility: CLINIC | Age: 36
End: 2021-12-08
Payer: COMMERCIAL

## 2021-12-09 ENCOUNTER — CLINICAL SUPPORT (OUTPATIENT)
Dept: REHABILITATION | Facility: HOSPITAL | Age: 36
End: 2021-12-09
Attending: PAIN MEDICINE
Payer: COMMERCIAL

## 2021-12-09 DIAGNOSIS — R29.898 WEAKNESS OF LEFT LOWER EXTREMITY: ICD-10-CM

## 2021-12-09 DIAGNOSIS — R26.2 DIFFICULTY WALKING: ICD-10-CM

## 2021-12-09 PROCEDURE — 97140 MANUAL THERAPY 1/> REGIONS: CPT | Mod: PN,CQ

## 2021-12-09 PROCEDURE — 97110 THERAPEUTIC EXERCISES: CPT | Mod: PN,CQ

## 2021-12-10 ENCOUNTER — TELEPHONE (OUTPATIENT)
Dept: PAIN MEDICINE | Facility: CLINIC | Age: 36
End: 2021-12-10
Payer: COMMERCIAL

## 2021-12-10 ENCOUNTER — PATIENT MESSAGE (OUTPATIENT)
Dept: PAIN MEDICINE | Facility: CLINIC | Age: 36
End: 2021-12-10
Payer: COMMERCIAL

## 2021-12-10 DIAGNOSIS — M47.816 LUMBAR SPONDYLOSIS: ICD-10-CM

## 2021-12-10 DIAGNOSIS — M51.36 DDD (DEGENERATIVE DISC DISEASE), LUMBAR: Primary | ICD-10-CM

## 2021-12-10 DIAGNOSIS — M62.838 MUSCLE SPASM: ICD-10-CM

## 2021-12-10 RX ORDER — TIZANIDINE 4 MG/1
16 TABLET ORAL NIGHTLY
Qty: 120 TABLET | Refills: 5 | Status: SHIPPED | OUTPATIENT
Start: 2021-12-10 | End: 2022-01-09

## 2021-12-14 ENCOUNTER — CLINICAL SUPPORT (OUTPATIENT)
Dept: REHABILITATION | Facility: HOSPITAL | Age: 36
End: 2021-12-14
Attending: PAIN MEDICINE
Payer: COMMERCIAL

## 2021-12-14 DIAGNOSIS — M54.42 CHRONIC BILATERAL LOW BACK PAIN WITH LEFT-SIDED SCIATICA: ICD-10-CM

## 2021-12-14 DIAGNOSIS — R26.2 DIFFICULTY WALKING: ICD-10-CM

## 2021-12-14 DIAGNOSIS — G89.29 CHRONIC BILATERAL LOW BACK PAIN WITH LEFT-SIDED SCIATICA: ICD-10-CM

## 2021-12-14 DIAGNOSIS — R29.898 WEAKNESS OF LEFT LOWER EXTREMITY: ICD-10-CM

## 2021-12-14 PROCEDURE — 97140 MANUAL THERAPY 1/> REGIONS: CPT | Mod: PN

## 2021-12-14 PROCEDURE — 97110 THERAPEUTIC EXERCISES: CPT | Mod: PN

## 2021-12-16 ENCOUNTER — CLINICAL SUPPORT (OUTPATIENT)
Dept: REHABILITATION | Facility: HOSPITAL | Age: 36
End: 2021-12-16
Attending: PAIN MEDICINE
Payer: COMMERCIAL

## 2021-12-16 DIAGNOSIS — R29.898 WEAKNESS OF LEFT LOWER EXTREMITY: ICD-10-CM

## 2021-12-16 DIAGNOSIS — R26.2 DIFFICULTY WALKING: ICD-10-CM

## 2021-12-16 PROCEDURE — 97140 MANUAL THERAPY 1/> REGIONS: CPT | Mod: PN,CQ

## 2021-12-16 PROCEDURE — 97110 THERAPEUTIC EXERCISES: CPT | Mod: PN,CQ

## 2021-12-21 ENCOUNTER — TELEPHONE (OUTPATIENT)
Dept: PAIN MEDICINE | Facility: CLINIC | Age: 36
End: 2021-12-21
Payer: COMMERCIAL

## 2021-12-21 ENCOUNTER — CLINICAL SUPPORT (OUTPATIENT)
Dept: REHABILITATION | Facility: HOSPITAL | Age: 36
End: 2021-12-21
Attending: PAIN MEDICINE
Payer: COMMERCIAL

## 2021-12-21 DIAGNOSIS — R29.898 WEAKNESS OF LEFT LOWER EXTREMITY: ICD-10-CM

## 2021-12-21 DIAGNOSIS — R26.2 DIFFICULTY WALKING: ICD-10-CM

## 2021-12-21 PROCEDURE — 97140 MANUAL THERAPY 1/> REGIONS: CPT | Mod: PN,CQ

## 2021-12-21 PROCEDURE — 97110 THERAPEUTIC EXERCISES: CPT | Mod: PN,CQ

## 2021-12-23 ENCOUNTER — CLINICAL SUPPORT (OUTPATIENT)
Dept: REHABILITATION | Facility: HOSPITAL | Age: 36
End: 2021-12-23
Attending: PAIN MEDICINE
Payer: COMMERCIAL

## 2021-12-23 DIAGNOSIS — M54.42 CHRONIC BILATERAL LOW BACK PAIN WITH LEFT-SIDED SCIATICA: ICD-10-CM

## 2021-12-23 DIAGNOSIS — R26.2 DIFFICULTY WALKING: ICD-10-CM

## 2021-12-23 DIAGNOSIS — R29.898 WEAKNESS OF LEFT LOWER EXTREMITY: ICD-10-CM

## 2021-12-23 DIAGNOSIS — G89.29 CHRONIC BILATERAL LOW BACK PAIN WITH LEFT-SIDED SCIATICA: ICD-10-CM

## 2021-12-23 PROCEDURE — 97110 THERAPEUTIC EXERCISES: CPT | Mod: PN

## 2021-12-23 PROCEDURE — 97140 MANUAL THERAPY 1/> REGIONS: CPT | Mod: PN

## 2021-12-28 ENCOUNTER — CLINICAL SUPPORT (OUTPATIENT)
Dept: REHABILITATION | Facility: HOSPITAL | Age: 36
End: 2021-12-28
Attending: PAIN MEDICINE
Payer: COMMERCIAL

## 2021-12-28 DIAGNOSIS — G89.29 CHRONIC BILATERAL LOW BACK PAIN WITH LEFT-SIDED SCIATICA: ICD-10-CM

## 2021-12-28 DIAGNOSIS — M54.42 CHRONIC BILATERAL LOW BACK PAIN WITH LEFT-SIDED SCIATICA: ICD-10-CM

## 2021-12-28 DIAGNOSIS — R26.2 DIFFICULTY WALKING: ICD-10-CM

## 2021-12-28 DIAGNOSIS — R29.898 WEAKNESS OF LEFT LOWER EXTREMITY: ICD-10-CM

## 2021-12-28 PROCEDURE — 97110 THERAPEUTIC EXERCISES: CPT | Mod: PN

## 2021-12-28 PROCEDURE — 97140 MANUAL THERAPY 1/> REGIONS: CPT | Mod: PN

## 2021-12-30 ENCOUNTER — CLINICAL SUPPORT (OUTPATIENT)
Dept: REHABILITATION | Facility: HOSPITAL | Age: 36
End: 2021-12-30
Attending: PAIN MEDICINE
Payer: COMMERCIAL

## 2021-12-30 DIAGNOSIS — R26.2 DIFFICULTY WALKING: ICD-10-CM

## 2021-12-30 DIAGNOSIS — M54.42 CHRONIC BILATERAL LOW BACK PAIN WITH LEFT-SIDED SCIATICA: ICD-10-CM

## 2021-12-30 DIAGNOSIS — G89.29 CHRONIC BILATERAL LOW BACK PAIN WITH LEFT-SIDED SCIATICA: ICD-10-CM

## 2021-12-30 DIAGNOSIS — R29.898 WEAKNESS OF LEFT LOWER EXTREMITY: ICD-10-CM

## 2021-12-30 PROCEDURE — 97110 THERAPEUTIC EXERCISES: CPT | Mod: PN

## 2021-12-30 PROCEDURE — 97140 MANUAL THERAPY 1/> REGIONS: CPT | Mod: PN

## 2022-01-06 ENCOUNTER — OFFICE VISIT (OUTPATIENT)
Dept: PAIN MEDICINE | Facility: CLINIC | Age: 37
End: 2022-01-06
Payer: COMMERCIAL

## 2022-01-06 ENCOUNTER — HOSPITAL ENCOUNTER (OUTPATIENT)
Dept: RADIOLOGY | Facility: HOSPITAL | Age: 37
Discharge: HOME | End: 2022-01-06
Attending: REGISTERED NURSE
Payer: COMMERCIAL

## 2022-01-06 VITALS
HEIGHT: 71 IN | TEMPERATURE: 98 F | OXYGEN SATURATION: 98 % | HEART RATE: 73 BPM | DIASTOLIC BLOOD PRESSURE: 84 MMHG | BODY MASS INDEX: 23.08 KG/M2 | SYSTOLIC BLOOD PRESSURE: 140 MMHG | WEIGHT: 164.88 LBS

## 2022-01-06 DIAGNOSIS — M54.16 LUMBAR RADICULOPATHY: ICD-10-CM

## 2022-01-06 DIAGNOSIS — M47.816 LUMBAR SPONDYLOSIS: Primary | ICD-10-CM

## 2022-01-06 DIAGNOSIS — M51.36 DDD (DEGENERATIVE DISC DISEASE), LUMBAR: ICD-10-CM

## 2022-01-06 PROCEDURE — 3008F BODY MASS INDEX DOCD: CPT | Mod: CPTII,S$GLB,, | Performed by: REGISTERED NURSE

## 2022-01-06 PROCEDURE — 3077F PR MOST RECENT SYSTOLIC BLOOD PRESSURE >= 140 MM HG: ICD-10-PCS | Mod: CPTII,S$GLB,, | Performed by: REGISTERED NURSE

## 2022-01-06 PROCEDURE — 1159F MED LIST DOCD IN RCRD: CPT | Mod: CPTII,S$GLB,, | Performed by: REGISTERED NURSE

## 2022-01-06 PROCEDURE — 3008F PR BODY MASS INDEX (BMI) DOCUMENTED: ICD-10-PCS | Mod: CPTII,S$GLB,, | Performed by: REGISTERED NURSE

## 2022-01-06 PROCEDURE — 3079F DIAST BP 80-89 MM HG: CPT | Mod: CPTII,S$GLB,, | Performed by: REGISTERED NURSE

## 2022-01-06 PROCEDURE — 99999 PR PBB SHADOW E&M-EST. PATIENT-LVL III: ICD-10-PCS | Mod: PBBFAC,,, | Performed by: REGISTERED NURSE

## 2022-01-06 PROCEDURE — 72148 MRI LUMBAR SPINE W/O DYE: CPT | Mod: 26,,, | Performed by: RADIOLOGY

## 2022-01-06 PROCEDURE — 72148 MRI LUMBAR SPINE WITHOUT CONTRAST: ICD-10-PCS | Mod: 26,,, | Performed by: RADIOLOGY

## 2022-01-06 PROCEDURE — 99214 OFFICE O/P EST MOD 30 MIN: CPT | Mod: S$GLB,,, | Performed by: REGISTERED NURSE

## 2022-01-06 PROCEDURE — 3077F SYST BP >= 140 MM HG: CPT | Mod: CPTII,S$GLB,, | Performed by: REGISTERED NURSE

## 2022-01-06 PROCEDURE — 1159F PR MEDICATION LIST DOCUMENTED IN MEDICAL RECORD: ICD-10-PCS | Mod: CPTII,S$GLB,, | Performed by: REGISTERED NURSE

## 2022-01-06 PROCEDURE — 99214 PR OFFICE/OUTPT VISIT, EST, LEVL IV, 30-39 MIN: ICD-10-PCS | Mod: S$GLB,,, | Performed by: REGISTERED NURSE

## 2022-01-06 PROCEDURE — 3079F PR MOST RECENT DIASTOLIC BLOOD PRESSURE 80-89 MM HG: ICD-10-PCS | Mod: CPTII,S$GLB,, | Performed by: REGISTERED NURSE

## 2022-01-06 PROCEDURE — 72148 MRI LUMBAR SPINE W/O DYE: CPT | Mod: TC

## 2022-01-06 PROCEDURE — 99999 PR PBB SHADOW E&M-EST. PATIENT-LVL III: CPT | Mod: PBBFAC,,, | Performed by: REGISTERED NURSE

## 2022-01-06 NOTE — PROGRESS NOTES
Subjective:     Patient ID: Cale Steiner is a 36 y.o. male    Chief Complaint: Leg Pain (Left leg )      Referred by: No ref. provider found      HPI:    Disclaimer: This note was generated using voice recognition software.  There may be typographical errors that were missed during proofreading.        Interval History NP (01/06/22):    Pt returns today for follow up. He reports that PT has been helpful but he continues to have left lower extremity pain. The pain is now less intense and no longer constant, but still bothersome. He continues gabapentin, but is not consistent with dosing. He denies any adverse effects. He also takes ibuprofen and tylenol prn for pain with good relief. He denies any new or worsening symptoms.     Initial Encounter (11/3/21):  Cale Steiner is a 36 y.o. male who presents today with left-sided buttock and lower extremity pain.  Patient has had this pain intermittently for years.  Episodes usually self-limited and mild.  He states that his current episode started a few weeks ago.  Is more severe than previous episodes and has not improved..  The pain is located in the left lumbosacral/gluteal region.  The pain will radiate to left posterior thigh.  He denies any associated numbness, tingling, weakness, bowel bladder dysfunction.  Pain is constant and worsened with activity.  Pain is also worse at night when lying down..   This pain is described in detail below.    Physical Therapy:  No.    Non-pharmacologic Treatment:  Rest helps         · TENS?  No    Pain Medications:         · Currently taking:  Meloxicam    · Has tried in the past:  NSAIDs, Tylenol    · Has not tried:  Opioids, Muscle relaxants, TCAs, SNRIs, anticonvulsants, topical creams    Blood thinners:  None    Interventional Therapies:  None    Relevant Surgeries:  None    Affecting sleep?  Yes    Affecting daily activities? yes    Depressive symptoms? no          · SI/HI? No    Work status: Employed    Pain  Scores:    Best:       3/10  Worst:     8/10  Usually:  6/10  Today:   3/10    Review of Systems   Constitutional: Negative for activity change, appetite change, chills, fatigue, fever and unexpected weight change.   HENT: Negative for hearing loss.    Eyes: Negative for visual disturbance.   Respiratory: Negative for chest tightness and shortness of breath.    Cardiovascular: Negative for chest pain.   Gastrointestinal: Negative for abdominal pain, constipation, diarrhea, nausea and vomiting.   Genitourinary: Negative for difficulty urinating.   Musculoskeletal: Positive for back pain, gait problem and myalgias. Negative for neck pain.   Skin: Negative for rash.   Neurological: Negative for dizziness, weakness, light-headedness, numbness and headaches.   Psychiatric/Behavioral: Positive for sleep disturbance. Negative for hallucinations and suicidal ideas. The patient is not nervous/anxious.        Past Medical History:   Diagnosis Date    Lumbar spondylosis 11/3/2021       Past Surgical History:   Procedure Laterality Date    thumb surgery         Social History     Socioeconomic History    Marital status:    Tobacco Use    Smoking status: Never Smoker    Smokeless tobacco: Never Used   Substance and Sexual Activity    Alcohol use: No    Drug use: No    Sexual activity: Yes     Partners: Female       Review of patient's allergies indicates:  No Known Allergies    Current Outpatient Medications on File Prior to Visit   Medication Sig Dispense Refill    gabapentin (NEURONTIN) 300 MG capsule Take 2 capsules (600 mg total) by mouth 3 (three) times daily. 180 capsule 5    tiZANidine (ZANAFLEX) 4 MG tablet Take 4 tablets (16 mg total) by mouth every evening. 120 tablet 5    [DISCONTINUED] meloxicam (MOBIC) 15 MG tablet Take 1 tablet (15 mg total) by mouth once daily. (Patient not taking: Reported on 1/6/2022) 30 tablet 0     No current facility-administered medications on file prior to visit.  "      Objective:      BP (!) 140/84 (BP Location: Right arm, Patient Position: Sitting, BP Method: Medium (Automatic))   Pulse 73   Temp 98.2 °F (36.8 °C) (Skin)   Ht 5' 11" (1.803 m)   Wt 74.8 kg (164 lb 14.4 oz)   SpO2 98%   BMI 23.00 kg/m²     Exam:  GEN:  Well developed, well nourished.  No acute distress.  Normal pain behavior.  HEENT:  No trauma.  Mucous membranes moist.  Nares patent bilaterally.  PSYCH: Normal affect. Thought content appropriate.  CHEST:  Breathing symmetric.  No audible wheezing.  ABD: Soft, non-distended.  SKIN:  Warm, pink, dry.  No rash on exposed areas.    EXT:  No cyanosis, clubbing, or edema.  No color change or changes in nail or hair growth.  NEURO/MUSCULOSKELETAL:  Fully alert, oriented, and appropriate. Speech normal renato. No cranial nerve deficits.   Gait:  Normal.  No trendelenburg sign bilaterally.           Imaging:    Narrative & Impression  EXAMINATION:  XR LUMBAR SPINE AP AND LAT WITH FLEX/EXT     CLINICAL HISTORY:  Other intervertebral disc degeneration, lumbar region     TECHNIQUE:  AP and lateral views as well as lateral flexion and extension images are performed through the lumbar spine.     COMPARISON:  None     FINDINGS:  Slight straightening of the expected normal lumbar lordosis with trace grade 1 retrolisthesis of L2 on L3 and L4 on L5 with neutral and extension positioning.  There is partial reduction of retrolisthesis at L2/L3 with flexion positioning..  Lumbar vertebral body heights and contours are within normal limits without evidence for acute fracture.  Slight convex left curvature lumbar spine.  Further evaluation as warranted clinically.     Impression:     Please see above        Electronically signed by: Ulysses Becker DO  Date:                                            11/03/2021  Time:                                           15:58    Assessment:       Encounter Diagnoses   Name Primary?    Lumbar radiculopathy     Lumbar spondylosis Yes "    DDD (degenerative disc disease), lumbar          Plan:       Cale was seen today for leg pain.    Diagnoses and all orders for this visit:    Lumbar spondylosis    Lumbar radiculopathy  -     MRI Lumbar Spine Without Contrast; Future    DDD (degenerative disc disease), lumbar        Cale Steiner is a 36 y.o. male with acute exacerbation of chronic intermittent left-sided low back and lower extremity pain.  Likely from lumbar degenerative disc disease and possibly nerve root impingement.  Not having overt signs of radiculopathy but is having radiating pain to lower extremity concerning for nerve root irritation..    1.  Lumbar x-rays reviewed with pt. Trace grade 1 retrolisthesis of L2 on L3 and L4 on L5 with neutral and extension positioning.   2.  Continue PT for ROM, strengthening, stretching and HEP.  3.  Continue 600 mg t.i.d. We discussed the importance of taking gabapentin as prescribed for maximum benefit as it is effective for his symptoms.   4.  Schedule lumbar MRI to evaluate intraspinal contents and curvature of spine.   5.  Follow-up after imaging to discuss results.           The above plan and management options were discussed at length with patient. Patient is in agreement with the above and verbalized understanding.    CHUY López, FNP-C  [facility]  Interventional Pain Management

## 2022-01-11 ENCOUNTER — OFFICE VISIT (OUTPATIENT)
Dept: PAIN MEDICINE | Facility: CLINIC | Age: 37
End: 2022-01-11
Payer: COMMERCIAL

## 2022-01-11 VITALS
TEMPERATURE: 98 F | HEIGHT: 71 IN | WEIGHT: 170 LBS | OXYGEN SATURATION: 98 % | HEART RATE: 66 BPM | BODY MASS INDEX: 23.8 KG/M2 | SYSTOLIC BLOOD PRESSURE: 129 MMHG | DIASTOLIC BLOOD PRESSURE: 71 MMHG

## 2022-01-11 DIAGNOSIS — M51.26 PROTRUSION OF LUMBAR INTERVERTEBRAL DISC: Primary | ICD-10-CM

## 2022-01-11 DIAGNOSIS — M54.16 LUMBAR RADICULOPATHY: ICD-10-CM

## 2022-01-11 PROCEDURE — 3008F PR BODY MASS INDEX (BMI) DOCUMENTED: ICD-10-PCS | Mod: CPTII,S$GLB,, | Performed by: REGISTERED NURSE

## 2022-01-11 PROCEDURE — 3074F SYST BP LT 130 MM HG: CPT | Mod: CPTII,S$GLB,, | Performed by: REGISTERED NURSE

## 2022-01-11 PROCEDURE — 99999 PR PBB SHADOW E&M-EST. PATIENT-LVL III: ICD-10-PCS | Mod: PBBFAC,,, | Performed by: REGISTERED NURSE

## 2022-01-11 PROCEDURE — 3008F BODY MASS INDEX DOCD: CPT | Mod: CPTII,S$GLB,, | Performed by: REGISTERED NURSE

## 2022-01-11 PROCEDURE — 1159F MED LIST DOCD IN RCRD: CPT | Mod: CPTII,S$GLB,, | Performed by: REGISTERED NURSE

## 2022-01-11 PROCEDURE — 3078F PR MOST RECENT DIASTOLIC BLOOD PRESSURE < 80 MM HG: ICD-10-PCS | Mod: CPTII,S$GLB,, | Performed by: REGISTERED NURSE

## 2022-01-11 PROCEDURE — 1159F PR MEDICATION LIST DOCUMENTED IN MEDICAL RECORD: ICD-10-PCS | Mod: CPTII,S$GLB,, | Performed by: REGISTERED NURSE

## 2022-01-11 PROCEDURE — 99214 PR OFFICE/OUTPT VISIT, EST, LEVL IV, 30-39 MIN: ICD-10-PCS | Mod: S$GLB,,, | Performed by: REGISTERED NURSE

## 2022-01-11 PROCEDURE — 99214 OFFICE O/P EST MOD 30 MIN: CPT | Mod: S$GLB,,, | Performed by: REGISTERED NURSE

## 2022-01-11 PROCEDURE — 3078F DIAST BP <80 MM HG: CPT | Mod: CPTII,S$GLB,, | Performed by: REGISTERED NURSE

## 2022-01-11 PROCEDURE — 99999 PR PBB SHADOW E&M-EST. PATIENT-LVL III: CPT | Mod: PBBFAC,,, | Performed by: REGISTERED NURSE

## 2022-01-11 PROCEDURE — 3074F PR MOST RECENT SYSTOLIC BLOOD PRESSURE < 130 MM HG: ICD-10-PCS | Mod: CPTII,S$GLB,, | Performed by: REGISTERED NURSE

## 2022-01-11 NOTE — PROGRESS NOTES
Subjective:     Patient ID: Cale Steiner is a 36 y.o. male    Chief Complaint: Leg Pain (Left leg pain /)      Referred by: No ref. provider found      HPI:    Disclaimer: This note was generated using voice recognition software.  There may be typographical errors that were missed during proofreading.        Interval History NP (1/11/22):    Pt returns today for follow up and imaging review. He reports that his pain continues to increase in the left lower extremity, mainly anterior thigh. Pain is sever at times and does affect his daily life, sleep, etc. He is at his worst a 9/10 on there pain scale. He has continued a HEP for years. He denies any new numbness, weakness, or tingling. He also denies bowel or bladder dysfunction. He does not wish to discuss interventional procedures at this time, but would prefer a referral to neurosurgery.     Interval History NP (01/06/22):    Pt returns today for follow up. He reports that PT has been helpful but he continues to have left lower extremity pain. The pain is now less intense and no longer constant, but still bothersome. He continues gabapentin, but is not consistent with dosing. He denies any adverse effects. He also takes ibuprofen and tylenol prn for pain with good relief. He denies any new or worsening symptoms.     Initial Encounter (11/3/21):  Cale Steiner is a 36 y.o. male who presents today with left-sided buttock and lower extremity pain.  Patient has had this pain intermittently for years.  Episodes usually self-limited and mild.  He states that his current episode started a few weeks ago.  Is more severe than previous episodes and has not improved..  The pain is located in the left lumbosacral/gluteal region.  The pain will radiate to left posterior thigh.  He denies any associated numbness, tingling, weakness, bowel bladder dysfunction.  Pain is constant and worsened with activity.  Pain is also worse at night when lying down..   This pain is  described in detail below.    Physical Therapy:  No, but does maintain a daily HEP.     Non-pharmacologic Treatment:  Rest helps         · TENS?  No    Pain Medications:         · Currently taking:  Meloxicam    · Has tried in the past:  NSAIDs, Tylenol    · Has not tried:  Opioids, Muscle relaxants, TCAs, SNRIs, anticonvulsants, topical creams    Blood thinners:  None    Interventional Therapies:  None    Relevant Surgeries:  None    Affecting sleep?  Yes    Affecting daily activities? yes    Depressive symptoms? no          · SI/HI? No    Work status: Employed    Pain Scores:    Best:       2/10  Worst:     8/10  Usually:  2/10  Today:   2/10    Review of Systems   Constitutional: Negative for activity change, appetite change, chills, fatigue, fever and unexpected weight change.   HENT: Negative for hearing loss.    Eyes: Negative for visual disturbance.   Respiratory: Negative for chest tightness and shortness of breath.    Cardiovascular: Negative for chest pain.   Gastrointestinal: Negative for abdominal pain, constipation, diarrhea, nausea and vomiting.   Genitourinary: Negative for difficulty urinating.   Musculoskeletal: Positive for back pain, gait problem and myalgias. Negative for neck pain.   Skin: Negative for rash.   Neurological: Negative for dizziness, weakness, light-headedness, numbness and headaches.   Psychiatric/Behavioral: Positive for sleep disturbance. Negative for hallucinations and suicidal ideas. The patient is not nervous/anxious.        Past Medical History:   Diagnosis Date    Lumbar spondylosis 11/3/2021       Past Surgical History:   Procedure Laterality Date    thumb surgery         Social History     Socioeconomic History    Marital status:    Tobacco Use    Smoking status: Never Smoker    Smokeless tobacco: Never Used   Substance and Sexual Activity    Alcohol use: No    Drug use: No    Sexual activity: Yes     Partners: Female       Review of patient's allergies  "indicates:  No Known Allergies    Current Outpatient Medications on File Prior to Visit   Medication Sig Dispense Refill    gabapentin (NEURONTIN) 300 MG capsule Take 2 capsules (600 mg total) by mouth 3 (three) times daily. 180 capsule 5     No current facility-administered medications on file prior to visit.       Objective:      /71 (BP Location: Right arm, Patient Position: Sitting, BP Method: Medium (Automatic))   Pulse 66   Temp 98.1 °F (36.7 °C) (Skin)   Ht 5' 11" (1.803 m)   Wt 77.1 kg (170 lb)   SpO2 98%   BMI 23.71 kg/m²     Exam:  GEN:  Well developed, well nourished.  No acute distress.  Normal pain behavior.  HEENT:  No trauma.  Mucous membranes moist.  Nares patent bilaterally.  PSYCH: Normal affect. Thought content appropriate.  CHEST:  Breathing symmetric.  No audible wheezing.  ABD: Soft, non-distended.  SKIN:  Warm, pink, dry.  No rash on exposed areas.    EXT:  No cyanosis, clubbing, or edema.  No color change or changes in nail or hair growth.  NEURO/MUSCULOSKELETAL:  Fully alert, oriented, and appropriate. Speech normal renato. No cranial nerve deficits.   Gait:  Normal.  No trendelenburg sign bilaterally.           Imaging:    Narrative & Impression  EXAMINATION:  MRI LUMBAR SPINE WITHOUT CONTRAST     CLINICAL HISTORY:  Lumbar radiculopathy, no red flags, no prior management; Radiculopathy, lumbar region     TECHNIQUE:  Multiplanar, multisequence MR images were acquired from the thoracolumbar junction to the sacrum without the administration of contrast.     COMPARISON:  11/03/2021     FINDINGS:  Straightening of the normal lumbar lordosis.  Otherwise, sagittal alignment is maintained.  Conus terminates at L1.  Visualized cord signal is within normal limits.  There is disc desiccation and mild height loss at L4-5 and L5-S1.  The remaining disc heights are maintained.  Vertebral body heights are maintained.  No evidence of fracture or marrow replacement process.  Visualized " retroperitoneal structures show no significant abnormalities.     T12-L1: No significant central canal stenosis or neural foraminal narrowing.     L1-2: No significant central canal stenosis or neural foraminal narrowing.     L2-3, L3-4: Mild broad-based disc bulge with mild facet arthropathy.  No significant central canal stenosis or neural foraminal narrowing.     L4-5: There is a diffuse disc bulge with a superimposed small right paracentral protrusion and annular fissure.  Mild facet arthropathy.  Mild lateral recess stenosis but overall no significant central canal stenosis or neural foraminal narrowing.     L5-S1: Diffuse disc bulge with an annular fissure with superimposed large left paracentral protrusion measuring 2.1 cm wide by 0.9 cm in AP diameter.  This causes left lateral recess stenosis and overall moderate central canal stenosis.  This likely impinges on the descending left S1 nerve root.  Given the size, this may impinge on the other descending nerve roots as well.  There is mild facet arthropathy with small facet effusions.  Mild neural foraminal narrowing.     Impression:     Degenerative disc disease at the lower 2 lumbar levels.  Findings most significant at L5-S1 where there is a large left paracentral protrusion likely impinging on the descending left S1 nerve root.  Specific details at each level above.     This report was flagged in Epic as abnormal.        Electronically signed by: Anastacio Fine MD  Date:                                            01/07/2022  Time:                                           08:22    Narrative & Impression  EXAMINATION:  XR LUMBAR SPINE AP AND LAT WITH FLEX/EXT     CLINICAL HISTORY:  Other intervertebral disc degeneration, lumbar region     TECHNIQUE:  AP and lateral views as well as lateral flexion and extension images are performed through the lumbar spine.     COMPARISON:  None     FINDINGS:  Slight straightening of the expected normal lumbar lordosis with  trace grade 1 retrolisthesis of L2 on L3 and L4 on L5 with neutral and extension positioning.  There is partial reduction of retrolisthesis at L2/L3 with flexion positioning..  Lumbar vertebral body heights and contours are within normal limits without evidence for acute fracture.  Slight convex left curvature lumbar spine.  Further evaluation as warranted clinically.     Impression:     Please see above        Electronically signed by: Ulysses Becker DO  Date:                                            11/03/2021  Time:                                           15:58    Assessment:       Encounter Diagnoses   Name Primary?    Protrusion of lumbar intervertebral disc Yes    Lumbar radiculopathy          Plan:       Cale was seen today for leg pain.    Diagnoses and all orders for this visit:    Protrusion of lumbar intervertebral disc  -     Ambulatory referral/consult to Neurosurgery; Future    Lumbar radiculopathy  -     Ambulatory referral/consult to Neurosurgery; Future        Cale Steiner is a 36 y.o. male with acute exacerbation of chronic intermittent left-sided low back and lower extremity pain.  Likely from lumbar degenerative disc disease and possibly nerve root impingement.  Not having overt signs of radiculopathy but is having radiating pain to lower extremity concerning for nerve root irritation..    1.  Lumbar MRI reviewed with pt. Degenerative disc disease at the lower 2 lumbar levels.  Findings most significant at L5-S1 where there is a large left paracentral protrusion likely impinging on the descending left S1 nerve root.  2.  Referral placed for Neurosurgery to see surgical consult. Pt would like to seek surgical options prior to ANEL at this time due to severity of symptoms.   3.  Continue 600 mg t.i.d. We discussed the importance of taking gabapentin as prescribed for maximum benefit as it is effective for his symptoms.   4.  RTC as needed at this time. May perform ANEL in the future if  indicated and appropriate.             The above plan and management options were discussed at length with patient. Patient is in agreement with the above and verbalized understanding.    CHUY López, FNP-C  [facility]  Interventional Pain Management

## 2022-01-19 NOTE — PROGRESS NOTES
"[facility]   Physical Therapy Treatment Note     Name: Cale Steiner  Clinic Number: [id number]    Therapy Diagnosis:   Encounter Diagnoses   Name Primary?    Chronic bilateral low back pain with left-sided sciatica Yes    Weakness of left lower extremity     Difficulty walking      Physician: Ferny Vences Jr*    Visit Date: 1/20/2022    Physician Orders: PT Eval and Treat   Medical Diagnosis from Referral: DDD (degenerative disc disease), lumbar [M51.36], Lumbar spondylosis [M47.816], Lumbar radiculopathy [M54.16]  Evaluation Date: 11/22/2021  Authorization Period Expiration: 11/03/2022  Plan of Care Expiration: 02/18/2022  Progress Note Due: 2/20/22   Visit # / Visits authorized: 7/pending   FOTO: 1/ 3   Precautions: Standard     PTA Visit #: 0/5     Time In: 1246  Time Out: 1332  Total Billable Time: 46 minutes    SUBJECTIVE     Pt reports: he missed last few appointments because he did not have a  for his son. Pt had MRI about 2 weeks ago and he feels since that his symptoms have greatly improvement. He has had improvements with physical therapy. Pain has reduced by 60%. 50% improvement in donning shoes and socks. He still has numbness between his pinky and 4th toe.   He was compliant with home exercise program.  Response to previous treatment: good with no soreness  Functional change: standing for longer, can bend down farther with less pain, walking more, and increased tolerance for daily tasks    Pain: 0/10  Location: left back      1/6/22 lumbar spine MRI: "Degenerative disc disease at the lower 2 lumbar levels. Findings most significant at L5-S1 where there is a large left paracentral protrusion likely impinging on the descending left S1 nerve root."     OBJECTIVE     Taken 1/20/22:  B LE MMT:  Hip flexion: R 5/5, L 4+/5  Hip abd: R 5/5, L 5/5  Hip ext: R 5/5, L 4/5  Hip add: R 5/5, L 5/5    Treatment     Cale received the treatments listed below:  "     Therapeutic exercises to develop strength, ROM, flexibility, posture and core stabilization for 46 minutes including:    Slump stretch flossing 5 sec hold x 20  Prone hip ext x 20  Prone windshield wipers x 20  Bird-dogs x 20 ea  Reverse crunch x 20  Squat holding 10# KBell tapping plyobox #2 x 20  SL RDL tapping plyobox #2 x 20 ea  Piriformis stretch 3 x 30 sec  Supine sciatic nerve glides 5 sec hold x 20    Not performed:  Glut Bridges 3x10 with TB for femoral ER   TA Activation x20,5s  Seated lumbar chair flexion 3x10  x5 min of bike for aerobic conditioning   Lateral Banded Shuffles x5 laps   Hip Flexor Stretch 4x30s  Glut Bridges 3x10    Manual therapy techniques: were applied for 0 minutes including:    Thoracic Mobilization Gd 5  Lumbar Rotational Mobilization Gd 5  SL Lumbar Flexion Mobilizations Gd 3  SL lumbar extension mobilization Gd 3     Patient Education and Home Exercises     Home Exercises Provided and Patient Education Provided     Education provided:   - HEP, form, frequency, anatomy    Written Home Exercises Provided: yes. 1/20/22. Exercises were reviewed and Cale was able to demonstrate them prior to the end of the session.  Cale demonstrated good  understanding of the education provided. See EMR under Patient Instructions for exercises provided during therapy sessions    ASSESSMENT     Cale had good tolerance to treatment today with no adverse effects. Post-treatment low back and L LE pain rated as 0/10. Pt with good response to progression of exercise program. No exacerbation of symptoms throughout session. Able to demonstrate increased LE AROM with nerve flossing. Pt challenged with core and single leg strengthening. He demonstrates R piriformis tightness and sciatic never pathology. Updated HEP provided.    Cale is progressing well towards his goals.   Pt prognosis is Fair.     Pt will continue to benefit from skilled outpatient physical therapy to address the deficits listed in the problem  list box on initial evaluation, provide pt/family education and to maximize pt's level of independence in the home and community environment.     Pt's spiritual, cultural and educational needs considered and pt agreeable to plan of care and goals.     Anticipated barriers to physical therapy: Work Requirements    Goals:   Short Term Goals: 4 weeks   1. The patient with report compliance with HEP to maximize functional outcomes.- met 1/20/22  2. The patient will demonstrate increase in BLE MMT by 1/2 grade to improve pt's functional mobility.- met 1/20/22  3. The patient will decrease pain level by 50% in order to improve QOL.- met 1/20/22     Long Term Goals: 8 weeks   1. The patient will demonstrate understanding and performance of advanced HEP to allow for independence once discharged from therapy.- in progress   2. 2. The patient will demonstrate increase in BLE MMT by 1 grade to improve pt's functional mobility.- in progress   3. The patient will report 44 % functional limitation on FOTO to indicate an improvement in overall function.- in progress   4. The patient will be able to julienne/doff shoes & socks w/o pain in order to improve pt's ability to perform ADLs.- in progress   5. Pt will be able to lift 25 lbs off the ground w/o pain in order to improve pt's ability to perform household chores.- in progress   6. Pt will squat 15x w/o pain to return to PLOF.- met 1/20/22    PLAN     Plan of care Certification: 11/22/2021 to 02/18/2022.     Outpatient Physical Therapy 2 times weekly for 6 weeks to include the following interventions: Cervical/Lumbar Traction, Electrical Stimulation Dry Needling, Gait Training, Manual Therapy, Moist Heat/ Ice, Neuromuscular Re-ed, Patient Education, Self Care, Therapeutic Activites, Therapeutic Exercise and Ultrasound.     Progress lumbar stabilization and LE strength.    Dolores Pagan, PT

## 2022-01-20 ENCOUNTER — CLINICAL SUPPORT (OUTPATIENT)
Dept: REHABILITATION | Facility: HOSPITAL | Age: 37
End: 2022-01-20
Attending: PAIN MEDICINE
Payer: COMMERCIAL

## 2022-01-20 DIAGNOSIS — M54.42 CHRONIC BILATERAL LOW BACK PAIN WITH LEFT-SIDED SCIATICA: Primary | ICD-10-CM

## 2022-01-20 DIAGNOSIS — R29.898 WEAKNESS OF LEFT LOWER EXTREMITY: ICD-10-CM

## 2022-01-20 DIAGNOSIS — R26.2 DIFFICULTY WALKING: ICD-10-CM

## 2022-01-20 DIAGNOSIS — G89.29 CHRONIC BILATERAL LOW BACK PAIN WITH LEFT-SIDED SCIATICA: Primary | ICD-10-CM

## 2022-01-20 PROCEDURE — 97110 THERAPEUTIC EXERCISES: CPT | Mod: PN

## 2022-01-26 ENCOUNTER — OFFICE VISIT (OUTPATIENT)
Dept: NEUROSURGERY | Facility: CLINIC | Age: 37
End: 2022-01-26
Payer: COMMERCIAL

## 2022-01-26 ENCOUNTER — PATIENT MESSAGE (OUTPATIENT)
Dept: NEUROSURGERY | Facility: CLINIC | Age: 37
End: 2022-01-26

## 2022-01-26 VITALS
OXYGEN SATURATION: 98 % | BODY MASS INDEX: 24.29 KG/M2 | SYSTOLIC BLOOD PRESSURE: 146 MMHG | DIASTOLIC BLOOD PRESSURE: 83 MMHG | HEART RATE: 61 BPM | WEIGHT: 174.19 LBS

## 2022-01-26 DIAGNOSIS — M51.26 PROTRUSION OF LUMBAR INTERVERTEBRAL DISC: ICD-10-CM

## 2022-01-26 DIAGNOSIS — M54.16 LUMBAR RADICULOPATHY: ICD-10-CM

## 2022-01-26 PROCEDURE — 3079F PR MOST RECENT DIASTOLIC BLOOD PRESSURE 80-89 MM HG: ICD-10-PCS | Mod: CPTII,S$GLB,, | Performed by: NEUROLOGICAL SURGERY

## 2022-01-26 PROCEDURE — 1159F PR MEDICATION LIST DOCUMENTED IN MEDICAL RECORD: ICD-10-PCS | Mod: CPTII,S$GLB,, | Performed by: NEUROLOGICAL SURGERY

## 2022-01-26 PROCEDURE — 1160F PR REVIEW ALL MEDS BY PRESCRIBER/CLIN PHARMACIST DOCUMENTED: ICD-10-PCS | Mod: CPTII,S$GLB,, | Performed by: NEUROLOGICAL SURGERY

## 2022-01-26 PROCEDURE — 3008F BODY MASS INDEX DOCD: CPT | Mod: CPTII,S$GLB,, | Performed by: NEUROLOGICAL SURGERY

## 2022-01-26 PROCEDURE — 3077F SYST BP >= 140 MM HG: CPT | Mod: CPTII,S$GLB,, | Performed by: NEUROLOGICAL SURGERY

## 2022-01-26 PROCEDURE — 3079F DIAST BP 80-89 MM HG: CPT | Mod: CPTII,S$GLB,, | Performed by: NEUROLOGICAL SURGERY

## 2022-01-26 PROCEDURE — 1159F MED LIST DOCD IN RCRD: CPT | Mod: CPTII,S$GLB,, | Performed by: NEUROLOGICAL SURGERY

## 2022-01-26 PROCEDURE — 3008F PR BODY MASS INDEX (BMI) DOCUMENTED: ICD-10-PCS | Mod: CPTII,S$GLB,, | Performed by: NEUROLOGICAL SURGERY

## 2022-01-26 PROCEDURE — 99999 PR PBB SHADOW E&M-EST. PATIENT-LVL IV: ICD-10-PCS | Mod: PBBFAC,,, | Performed by: NEUROLOGICAL SURGERY

## 2022-01-26 PROCEDURE — 99204 OFFICE O/P NEW MOD 45 MIN: CPT | Mod: S$GLB,,, | Performed by: NEUROLOGICAL SURGERY

## 2022-01-26 PROCEDURE — 99999 PR PBB SHADOW E&M-EST. PATIENT-LVL IV: CPT | Mod: PBBFAC,,, | Performed by: NEUROLOGICAL SURGERY

## 2022-01-26 PROCEDURE — 99204 PR OFFICE/OUTPT VISIT, NEW, LEVL IV, 45-59 MIN: ICD-10-PCS | Mod: S$GLB,,, | Performed by: NEUROLOGICAL SURGERY

## 2022-01-26 PROCEDURE — 1160F RVW MEDS BY RX/DR IN RCRD: CPT | Mod: CPTII,S$GLB,, | Performed by: NEUROLOGICAL SURGERY

## 2022-01-26 PROCEDURE — 3077F PR MOST RECENT SYSTOLIC BLOOD PRESSURE >= 140 MM HG: ICD-10-PCS | Mod: CPTII,S$GLB,, | Performed by: NEUROLOGICAL SURGERY

## 2022-01-26 NOTE — PROGRESS NOTES
Subjective:   I, Norma Guerrier, attest that this documentation has been prepared under the direction and in the presence of Irvin Nuñez MD.     Patient ID: Cale Steiner is a 36 y.o. male     Chief Complaint: Lumbar Spine Pain (L-Spine)          HPI  Mr. Cale Steiner is a 36 y.o. gentleman with lumbar spondylosis, who was referred to me by Peri Brian NP, presenting today for spine surgical consultation with MRI lumbar spine. This is a pt with low back pain that began in October 2021, after waking up one morning. Denies any injuries or trauma. The pain radiates to his legs, more on the left leg than the right. He is in physical therapy and is compliant with performing the exercise movements. There has been improvement to his pain. He is on OTC Advil and Gabapentin. states his pain overall has been improving, however he has refrained from performing rigorous activities and working out. Denies any weakness in the legs.       Review of Systems   Constitutional: Positive for activity change. Negative for appetite change, fatigue, fever and unexpected weight change.   HENT: Negative for facial swelling.    Eyes: Negative.    Respiratory: Negative.    Cardiovascular: Negative.    Gastrointestinal: Negative for diarrhea, nausea and vomiting.   Endocrine: Negative.    Genitourinary: Negative.    Musculoskeletal: Positive for back pain and myalgias. Negative for joint swelling and neck pain.   Neurological: Negative for dizziness, seizures, weakness, numbness and headaches.   Psychiatric/Behavioral: Negative.       Past Medical History:   Diagnosis Date    Lumbar spondylosis 11/3/2021       Objective:      Vitals:    01/26/22 1128   BP: (!) 146/83   Pulse: 61      Physical Exam  Constitutional:       General: He is not in acute distress.     Appearance: Normal appearance.   HENT:      Head: Normocephalic and atraumatic.   Pulmonary:      Effort: Pulmonary effort is normal.   Musculoskeletal:      Cervical  back: Neck supple.   Neurological:      Mental Status: He is alert and oriented to person, place, and time.      GCS: GCS eye subscore is 4. GCS verbal subscore is 5. GCS motor subscore is 6.      Cranial Nerves: No cranial nerve deficit.            IMAGING:  MRI Lumbar Spine Without Contrast (1/6/2022): Degenerative disc disease at the lower 2 lumbar levels, L4-L5 and L5-S1. Findings most significant at L5-S1 where there is a large left paracentral protrusion likely impinging on the descending left S1 nerve root.      I have personally reviewed the images with the pt.      I, Dr. Irvin Nuñez, personally performed the services described in this documentation. All medical record entries made by the scribe, Norma Guerrier, were at my direction and in my presence.  I have reviewed the chart and agree that the record reflects my personal performance and is accurate and complete. Irvin Nuñez MD. 01/26/2022    Assessment:       1. Protrusion of lumbar intervertebral disc    2. Lumbar radiculopathy         Plan:   I have personally reviewed the MRI lumbar with the pt which shows degenerative disc disease at the lower 2 lumbar levels, L4-L5 and L5-S1. Findings most significant at L5-S1 where there is a large left paracentral protrusion likely impinging on the descending left S1 nerve root.    Given the patient's symptoms and imaging results, no surgical intervention is required at this time. I recommend following with pain management for conservative treatment. I advise pt to continue physical therapy.     Pt advised to contact us with any questions, concerns, or if he experiences any new or worsening symptoms.

## 2022-01-26 NOTE — PATIENT INSTRUCTIONS
I have personally reviewed the MRI lumbar with the pt which shows degenerative disc disease at the lower 2 lumbar levels, L4-L5 and L5-S1. Findings most significant at L5-S1 where there is a large left paracentral protrusion likely impinging on the descending left S1 nerve root.    Given the patient's symptoms and imaging results, no surgical intervention is required at this time. I recommend following with pain management for conservative treatment. I advise pt to continue physical therapy.     Pt advised to contact us with any questions, concerns, or if he experiences any new or worsening symptoms.

## 2022-01-27 ENCOUNTER — OFFICE VISIT (OUTPATIENT)
Dept: FAMILY MEDICINE | Facility: CLINIC | Age: 37
End: 2022-01-27
Payer: COMMERCIAL

## 2022-01-27 VITALS
WEIGHT: 172.63 LBS | TEMPERATURE: 98 F | HEIGHT: 71 IN | OXYGEN SATURATION: 96 % | SYSTOLIC BLOOD PRESSURE: 116 MMHG | BODY MASS INDEX: 24.17 KG/M2 | DIASTOLIC BLOOD PRESSURE: 62 MMHG | HEART RATE: 68 BPM

## 2022-01-27 DIAGNOSIS — M51.36 DDD (DEGENERATIVE DISC DISEASE), LUMBAR: ICD-10-CM

## 2022-01-27 DIAGNOSIS — Z00.00 ROUTINE PHYSICAL EXAMINATION: Primary | ICD-10-CM

## 2022-01-27 DIAGNOSIS — Z23 NEED FOR IMMUNIZATION AGAINST INFLUENZA: ICD-10-CM

## 2022-01-27 PROBLEM — R29.898 WEAKNESS OF LEFT LOWER EXTREMITY: Status: RESOLVED | Noted: 2021-11-22 | Resolved: 2022-01-27

## 2022-01-27 PROBLEM — M51.369 DDD (DEGENERATIVE DISC DISEASE), LUMBAR: Chronic | Status: ACTIVE | Noted: 2021-11-03

## 2022-01-27 PROBLEM — M54.42 BILATERAL LOW BACK PAIN WITH LEFT-SIDED SCIATICA: Status: RESOLVED | Noted: 2021-11-22 | Resolved: 2022-01-27

## 2022-01-27 PROBLEM — R26.2 DIFFICULTY WALKING: Status: RESOLVED | Noted: 2021-11-22 | Resolved: 2022-01-27

## 2022-01-27 PROBLEM — M47.816 LUMBAR SPONDYLOSIS: Status: RESOLVED | Noted: 2021-11-03 | Resolved: 2022-01-27

## 2022-01-27 PROCEDURE — 3044F PR MOST RECENT HEMOGLOBIN A1C LEVEL <7.0%: ICD-10-PCS | Mod: CPTII,S$GLB,, | Performed by: FAMILY MEDICINE

## 2022-01-27 PROCEDURE — 3008F PR BODY MASS INDEX (BMI) DOCUMENTED: ICD-10-PCS | Mod: CPTII,S$GLB,, | Performed by: FAMILY MEDICINE

## 2022-01-27 PROCEDURE — 90686 FLU VACCINE (QUAD) GREATER THAN OR EQUAL TO 3YO PRESERVATIVE FREE IM: ICD-10-PCS | Mod: S$GLB,,, | Performed by: FAMILY MEDICINE

## 2022-01-27 PROCEDURE — 3078F DIAST BP <80 MM HG: CPT | Mod: CPTII,S$GLB,, | Performed by: FAMILY MEDICINE

## 2022-01-27 PROCEDURE — 90471 FLU VACCINE (QUAD) GREATER THAN OR EQUAL TO 3YO PRESERVATIVE FREE IM: ICD-10-PCS | Mod: S$GLB,,, | Performed by: FAMILY MEDICINE

## 2022-01-27 PROCEDURE — 3074F SYST BP LT 130 MM HG: CPT | Mod: CPTII,S$GLB,, | Performed by: FAMILY MEDICINE

## 2022-01-27 PROCEDURE — 99999 PR PBB SHADOW E&M-EST. PATIENT-LVL III: CPT | Mod: PBBFAC,,, | Performed by: FAMILY MEDICINE

## 2022-01-27 PROCEDURE — 99999 PR PBB SHADOW E&M-EST. PATIENT-LVL III: ICD-10-PCS | Mod: PBBFAC,,, | Performed by: FAMILY MEDICINE

## 2022-01-27 PROCEDURE — 99395 PR PREVENTIVE VISIT,EST,18-39: ICD-10-PCS | Mod: 25,S$GLB,, | Performed by: FAMILY MEDICINE

## 2022-01-27 PROCEDURE — 99395 PREV VISIT EST AGE 18-39: CPT | Mod: 25,S$GLB,, | Performed by: FAMILY MEDICINE

## 2022-01-27 PROCEDURE — 3008F BODY MASS INDEX DOCD: CPT | Mod: CPTII,S$GLB,, | Performed by: FAMILY MEDICINE

## 2022-01-27 PROCEDURE — 1159F MED LIST DOCD IN RCRD: CPT | Mod: CPTII,S$GLB,, | Performed by: FAMILY MEDICINE

## 2022-01-27 PROCEDURE — 3044F HG A1C LEVEL LT 7.0%: CPT | Mod: CPTII,S$GLB,, | Performed by: FAMILY MEDICINE

## 2022-01-27 PROCEDURE — 90471 IMMUNIZATION ADMIN: CPT | Mod: S$GLB,,, | Performed by: FAMILY MEDICINE

## 2022-01-27 PROCEDURE — 3074F PR MOST RECENT SYSTOLIC BLOOD PRESSURE < 130 MM HG: ICD-10-PCS | Mod: CPTII,S$GLB,, | Performed by: FAMILY MEDICINE

## 2022-01-27 PROCEDURE — 1159F PR MEDICATION LIST DOCUMENTED IN MEDICAL RECORD: ICD-10-PCS | Mod: CPTII,S$GLB,, | Performed by: FAMILY MEDICINE

## 2022-01-27 PROCEDURE — 90686 IIV4 VACC NO PRSV 0.5 ML IM: CPT | Mod: S$GLB,,, | Performed by: FAMILY MEDICINE

## 2022-01-27 PROCEDURE — 3078F PR MOST RECENT DIASTOLIC BLOOD PRESSURE < 80 MM HG: ICD-10-PCS | Mod: CPTII,S$GLB,, | Performed by: FAMILY MEDICINE

## 2022-01-27 RX ORDER — NAPROXEN 500 MG/1
500 TABLET ORAL 2 TIMES DAILY PRN
Qty: 60 TABLET | Refills: 1 | Status: SHIPPED | OUTPATIENT
Start: 2022-01-27 | End: 2022-04-07

## 2022-01-27 NOTE — PROGRESS NOTES
[facility]  Progress Note    SUBJECTIVE:     Chief Complaint   Patient presents with    Annual Exam       HPI   Cale Steiner  is a 36 y.o. male here for routine physical exam. Recently saw neurosurgery for disc bulge. No surgery recommended. Doing well with PT and meds. Patient has no other new complaints/problems at this time.      Review of patient's allergies indicates:  No Known Allergies    Past Medical History:   Diagnosis Date    Lumbar spondylosis 11/3/2021     Past Surgical History:   Procedure Laterality Date    thumb surgery       Family History   Problem Relation Age of Onset    Multiple myeloma Mother     No Known Problems Father     No Known Problems Brother     No Known Problems Sister     No Known Problems Maternal Aunt     No Known Problems Maternal Uncle     No Known Problems Paternal Aunt     No Known Problems Paternal Uncle     No Known Problems Maternal Grandmother     No Known Problems Maternal Grandfather     No Known Problems Paternal Grandmother     No Known Problems Paternal Grandfather     Amblyopia Neg Hx     Blindness Neg Hx     Cancer Neg Hx     Cataracts Neg Hx     Diabetes Neg Hx     Glaucoma Neg Hx     Hypertension Neg Hx     Macular degeneration Neg Hx     Retinal detachment Neg Hx     Strabismus Neg Hx     Stroke Neg Hx     Thyroid disease Neg Hx      Social History     Tobacco Use    Smoking status: Never Smoker    Smokeless tobacco: Never Used   Substance Use Topics    Alcohol use: No    Drug use: No        Review of Systems   Constitutional: Negative for chills, diaphoresis and fever.   HENT: Negative for congestion, ear pain and sore throat.    Eyes: Negative for photophobia and discharge.   Respiratory: Negative for cough, shortness of breath and wheezing.    Cardiovascular: Negative for chest pain and palpitations.   Gastrointestinal: Negative for abdominal pain, constipation, diarrhea, nausea and vomiting.   Genitourinary:  Negative for dysuria and hematuria.   Musculoskeletal: Positive for back pain. Negative for myalgias.   Skin: Negative for itching and rash.   Neurological: Negative for dizziness, sensory change, focal weakness, weakness and headaches.   All other systems reviewed and are negative.    OBJECTIVE:     Vitals:    01/27/22 1333   BP: 116/62   Pulse: 68   Temp: 98 °F (36.7 °C)     Body mass index is 24.08 kg/m².    Physical Exam  Constitutional:       General: He is not in acute distress.     Appearance: He is not diaphoretic.   HENT:      Head: Normocephalic and atraumatic.      Right Ear: Tympanic membrane and ear canal normal. No hemotympanum. Tympanic membrane is not perforated, erythematous or bulging.      Left Ear: Tympanic membrane and ear canal normal. No hemotympanum. Tympanic membrane is not perforated, erythematous or bulging.      Mouth/Throat:      Pharynx: No oropharyngeal exudate.   Eyes:      Conjunctiva/sclera: Conjunctivae normal.      Pupils: Pupils are equal, round, and reactive to light.   Neck:      Thyroid: No thyromegaly.   Cardiovascular:      Rate and Rhythm: Normal rate and regular rhythm.      Heart sounds: Normal heart sounds. No murmur heard.  No friction rub. No gallop.    Pulmonary:      Effort: Pulmonary effort is normal. No respiratory distress.      Breath sounds: Normal breath sounds. No wheezing or rales.   Abdominal:      General: Bowel sounds are normal. There is no distension.      Palpations: Abdomen is soft.      Tenderness: There is no abdominal tenderness. There is no guarding or rebound.   Musculoskeletal:         General: No tenderness. Normal range of motion.   Lymphadenopathy:      Cervical: No cervical adenopathy.   Skin:     General: Skin is warm.      Findings: No erythema or rash.   Neurological:      Mental Status: He is alert and oriented to person, place, and time.         Old records were reviewed. Labs and/or images were independently reviewed.    ASSESSMENT      1. Routine physical examination    2. DDD (degenerative disc disease), lumbar    3. Need for immunization against influenza        PLAN:     Routine physical examination  -     CBC Auto Differential; Future  -     Comprehensive Metabolic Panel; Future  -     Hemoglobin A1C; Future  -     Lipid Panel; Future  -     TSH; Future  -     T4, Free; Future  -     We briefly discussed diet, exercise, and routine preventive exams. All questions and comments addressed.    DDD (degenerative disc disease), lumbar  -     naproxen (NAPROSYN) 500 MG tablet; Take 1 tablet (500 mg total) by mouth 2 (two) times daily as needed.  Dispense: 60 tablet; Refill: 1  -     F/u with PT/OT.     Need for immunization against influenza  -     Influenza - Quadrivalent (PF)      RTC PRSANDY Santamaria MD  01/27/2022 1:44 PM

## 2022-01-28 ENCOUNTER — CLINICAL SUPPORT (OUTPATIENT)
Dept: REHABILITATION | Facility: HOSPITAL | Age: 37
End: 2022-01-28
Attending: PAIN MEDICINE
Payer: COMMERCIAL

## 2022-01-28 DIAGNOSIS — G89.29 CHRONIC BILATERAL LOW BACK PAIN WITH LEFT-SIDED SCIATICA: Primary | ICD-10-CM

## 2022-01-28 DIAGNOSIS — M54.42 CHRONIC BILATERAL LOW BACK PAIN WITH LEFT-SIDED SCIATICA: Primary | ICD-10-CM

## 2022-01-28 PROCEDURE — 97110 THERAPEUTIC EXERCISES: CPT | Mod: PN,CQ

## 2022-01-28 NOTE — PROGRESS NOTES
"[facility]   Physical Therapy Treatment Note     Name: Cale Steiner  Clinic Number: [id number]    Therapy Diagnosis:   Encounter Diagnosis   Name Primary?    Chronic bilateral low back pain with left-sided sciatica Yes     Physician: Ferny Vences Jr*    Visit Date: 1/28/2022    Physician Orders: PT Eval and Treat   Medical Diagnosis from Referral: DDD (degenerative disc disease), lumbar [M51.36], Lumbar spondylosis [M47.816], Lumbar radiculopathy [M54.16]  Evaluation Date: 11/22/2021  Authorization Period Expiration: 11/03/2022  Plan of Care Expiration: 02/18/2022  Progress Note Due: 2/20/22   Visit # / Visits authorized: 1/20  FOTO: 1/ 3   Precautions: Standard     PTA Visit #: 1/5     Time In: 7:30  Time Out: 8:15  Total Billable Time: 46 minutes    SUBJECTIVE     Pt reports: He still has some numbness but overall has seen improvements in his sleep and everyday functional activities. He is scheduled to have epidural shot.   He was compliant with home exercise program.  Response to previous treatment: good with no soreness  Functional change: standing for longer, can bend down farther with less pain, walking more, and increased tolerance for daily tasks    Pain: 0/10  Location: left back      1/6/22 lumbar spine MRI: "Degenerative disc disease at the lower 2 lumbar levels. Findings most significant at L5-S1 where there is a large left paracentral protrusion likely impinging on the descending left S1 nerve root."     OBJECTIVE     Taken 1/20/22:  B LE MMT:  Hip flexion: R 5/5, L 4+/5  Hip abd: R 5/5, L 5/5  Hip ext: R 5/5, L 4/5  Hip add: R 5/5, L 5/5    Treatment     Cale received the treatments listed below:      Therapeutic exercises to develop strength, ROM, flexibility, posture and core stabilization for 45 minutes including:    Slump stretch flossing 5 sec hold x 20  Prone windshield wipers x 20  Reverse crunch x 20  Squat holding 10# KBell tapping plyobox 12" x 20  SL RDL " "tapping plyobox 12" #10 x 20 ea  +shuttle kickbacks 1 red strap 2 x 8 ea  Piriformis stretch 3 x 30 sec  Supine sciatic nerve glides 5 sec hold x 20    Not performed:  Bird-dogs x 20 ea  Prone hip ext x 20  Glut Bridges 3x10 with TB for femoral ER   TA Activation x20,5s  Seated lumbar chair flexion 3x10  x5 min of bike for aerobic conditioning   Lateral Banded Shuffles x5 laps   Hip Flexor Stretch 4x30s  Glut Bridges 3x10    Manual therapy techniques: were applied for 0 minutes including:    Thoracic Mobilization Gd 5  Lumbar Rotational Mobilization Gd 5  SL Lumbar Flexion Mobilizations Gd 3  SL lumbar extension mobilization Gd 3     Patient Education and Home Exercises     Home Exercises Provided and Patient Education Provided     Education provided:   - HEP, form, frequency, anatomy    Written Home Exercises Provided: yes. 1/20/22. Exercises were reviewed and Cale was able to demonstrate them prior to the end of the session.  Cale demonstrated good  understanding of the education provided. See EMR under Patient Instructions for exercises provided during therapy sessions    ASSESSMENT     Cale had good tolerance to treatment today with no adverse effects. Pt challenged with core and single leg strengthening. Able to progress in depth of squatting and SL DRL as well as increase in load. He demonstrates R piriformis tightness and sciatic neve pathology. Good tolerance to nerve glides and piriformis stretching.  Updated HEP provided.    Cale is progressing well towards his goals.   Pt prognosis is Fair.     Pt will continue to benefit from skilled outpatient physical therapy to address the deficits listed in the problem list box on initial evaluation, provide pt/family education and to maximize pt's level of independence in the home and community environment.     Pt's spiritual, cultural and educational needs considered and pt agreeable to plan of care and goals.     Anticipated barriers to physical therapy: Work " Requirements    Goals:   Short Term Goals: 4 weeks   1. The patient with report compliance with HEP to maximize functional outcomes.- met 1/20/22  2. The patient will demonstrate increase in BLE MMT by 1/2 grade to improve pt's functional mobility.- met 1/20/22  3. The patient will decrease pain level by 50% in order to improve QOL.- met 1/20/22     Long Term Goals: 8 weeks   1. The patient will demonstrate understanding and performance of advanced HEP to allow for independence once discharged from therapy.- in progress   2. 2. The patient will demonstrate increase in BLE MMT by 1 grade to improve pt's functional mobility.- in progress   3. The patient will report 44 % functional limitation on FOTO to indicate an improvement in overall function.- in progress   4. The patient will be able to julienne/doff shoes & socks w/o pain in order to improve pt's ability to perform ADLs.- in progress   5. Pt will be able to lift 25 lbs off the ground w/o pain in order to improve pt's ability to perform household chores.- in progress   6. Pt will squat 15x w/o pain to return to PLOF.- met 1/20/22    PLAN     Plan of care Certification: 11/22/2021 to 02/18/2022.     Outpatient Physical Therapy 2 times weekly for 6 weeks to include the following interventions: Cervical/Lumbar Traction, Electrical Stimulation Dry Needling, Gait Training, Manual Therapy, Moist Heat/ Ice, Neuromuscular Re-ed, Patient Education, Self Care, Therapeutic Activites, Therapeutic Exercise and Ultrasound.     Progress lumbar stabilization and LE strength.    Elizabeth Cowan, PTA

## 2022-01-31 ENCOUNTER — LAB VISIT (OUTPATIENT)
Dept: LAB | Facility: HOSPITAL | Age: 37
End: 2022-01-31
Attending: FAMILY MEDICINE
Payer: COMMERCIAL

## 2022-01-31 DIAGNOSIS — Z00.00 ROUTINE PHYSICAL EXAMINATION: ICD-10-CM

## 2022-01-31 LAB
ALBUMIN SERPL BCP-MCNC: 4.1 G/DL (ref 3.5–5.2)
ALP SERPL-CCNC: 67 U/L (ref 55–135)
ALT SERPL W/O P-5'-P-CCNC: 39 U/L (ref 10–44)
ANION GAP SERPL CALC-SCNC: 7 MMOL/L (ref 8–16)
AST SERPL-CCNC: 28 U/L (ref 10–40)
BASOPHILS # BLD AUTO: 0.01 K/UL (ref 0–0.2)
BASOPHILS NFR BLD: 0.3 % (ref 0–1.9)
BILIRUB SERPL-MCNC: 0.7 MG/DL (ref 0.1–1)
BUN SERPL-MCNC: 13 MG/DL (ref 6–20)
CALCIUM SERPL-MCNC: 10.3 MG/DL (ref 8.7–10.5)
CHLORIDE SERPL-SCNC: 108 MMOL/L (ref 95–110)
CHOLEST SERPL-MCNC: 195 MG/DL (ref 120–199)
CHOLEST/HDLC SERPL: 3 {RATIO} (ref 2–5)
CO2 SERPL-SCNC: 28 MMOL/L (ref 23–29)
CREAT SERPL-MCNC: 1.1 MG/DL (ref 0.5–1.4)
DIFFERENTIAL METHOD: ABNORMAL
EOSINOPHIL # BLD AUTO: 0.1 K/UL (ref 0–0.5)
EOSINOPHIL NFR BLD: 4.5 % (ref 0–8)
ERYTHROCYTE [DISTWIDTH] IN BLOOD BY AUTOMATED COUNT: 12.3 % (ref 11.5–14.5)
EST. GFR  (AFRICAN AMERICAN): >60 ML/MIN/1.73 M^2
EST. GFR  (NON AFRICAN AMERICAN): >60 ML/MIN/1.73 M^2
ESTIMATED AVG GLUCOSE: 111 MG/DL (ref 68–131)
GLUCOSE SERPL-MCNC: 99 MG/DL (ref 70–110)
HBA1C MFR BLD: 5.5 % (ref 4–5.6)
HCT VFR BLD AUTO: 45.7 % (ref 40–54)
HDLC SERPL-MCNC: 64 MG/DL (ref 40–75)
HDLC SERPL: 32.8 % (ref 20–50)
HGB BLD-MCNC: 15.2 G/DL (ref 14–18)
IMM GRANULOCYTES # BLD AUTO: 0 K/UL (ref 0–0.04)
IMM GRANULOCYTES NFR BLD AUTO: 0 % (ref 0–0.5)
LDLC SERPL CALC-MCNC: 121.4 MG/DL (ref 63–159)
LYMPHOCYTES # BLD AUTO: 1.3 K/UL (ref 1–4.8)
LYMPHOCYTES NFR BLD: 43.3 % (ref 18–48)
MCH RBC QN AUTO: 31 PG (ref 27–31)
MCHC RBC AUTO-ENTMCNC: 33.3 G/DL (ref 32–36)
MCV RBC AUTO: 93 FL (ref 82–98)
MONOCYTES # BLD AUTO: 0.3 K/UL (ref 0.3–1)
MONOCYTES NFR BLD: 11.4 % (ref 4–15)
NEUTROPHILS # BLD AUTO: 1.2 K/UL (ref 1.8–7.7)
NEUTROPHILS NFR BLD: 40.5 % (ref 38–73)
NONHDLC SERPL-MCNC: 131 MG/DL
NRBC BLD-RTO: 0 /100 WBC
PLATELET # BLD AUTO: 237 K/UL (ref 150–450)
PMV BLD AUTO: 10.4 FL (ref 9.2–12.9)
POTASSIUM SERPL-SCNC: 4.6 MMOL/L (ref 3.5–5.1)
PROT SERPL-MCNC: 7 G/DL (ref 6–8.4)
RBC # BLD AUTO: 4.9 M/UL (ref 4.6–6.2)
SODIUM SERPL-SCNC: 143 MMOL/L (ref 136–145)
T4 FREE SERPL-MCNC: 0.74 NG/DL (ref 0.71–1.51)
TRIGL SERPL-MCNC: 48 MG/DL (ref 30–150)
TSH SERPL DL<=0.005 MIU/L-ACNC: 1.13 UIU/ML (ref 0.4–4)
WBC # BLD AUTO: 2.89 K/UL (ref 3.9–12.7)

## 2022-01-31 PROCEDURE — 84443 ASSAY THYROID STIM HORMONE: CPT | Performed by: FAMILY MEDICINE

## 2022-01-31 PROCEDURE — 36415 COLL VENOUS BLD VENIPUNCTURE: CPT | Mod: PO | Performed by: FAMILY MEDICINE

## 2022-01-31 PROCEDURE — 85025 COMPLETE CBC W/AUTO DIFF WBC: CPT | Performed by: FAMILY MEDICINE

## 2022-01-31 PROCEDURE — 80061 LIPID PANEL: CPT | Performed by: FAMILY MEDICINE

## 2022-01-31 PROCEDURE — 83036 HEMOGLOBIN GLYCOSYLATED A1C: CPT | Performed by: FAMILY MEDICINE

## 2022-01-31 PROCEDURE — 84439 ASSAY OF FREE THYROXINE: CPT | Performed by: FAMILY MEDICINE

## 2022-01-31 PROCEDURE — 80053 COMPREHEN METABOLIC PANEL: CPT | Performed by: FAMILY MEDICINE

## 2022-02-01 ENCOUNTER — CLINICAL SUPPORT (OUTPATIENT)
Dept: REHABILITATION | Facility: HOSPITAL | Age: 37
End: 2022-02-01
Attending: PAIN MEDICINE
Payer: COMMERCIAL

## 2022-02-01 DIAGNOSIS — M54.16 LUMBAR RADICULOPATHY: ICD-10-CM

## 2022-02-01 DIAGNOSIS — M51.36 DDD (DEGENERATIVE DISC DISEASE), LUMBAR: Primary | Chronic | ICD-10-CM

## 2022-02-01 PROCEDURE — 97110 THERAPEUTIC EXERCISES: CPT | Mod: PN

## 2022-02-01 PROCEDURE — 97140 MANUAL THERAPY 1/> REGIONS: CPT | Mod: PN

## 2022-02-01 NOTE — PROGRESS NOTES
"[facility]   Physical Therapy Treatment Note     Name: Cale Steiner  Clinic Number: [id number]    Therapy Diagnosis:   Encounter Diagnoses   Name Primary?    DDD (degenerative disc disease), lumbar Yes    Lumbar radiculopathy      Physician: Ferny Vences Jr*    Visit Date: 2/1/2022    Physician Orders: PT Eval and Treat   Medical Diagnosis from Referral: DDD (degenerative disc disease), lumbar [M51.36], Lumbar spondylosis [M47.816], Lumbar radiculopathy [M54.16]  Evaluation Date: 11/22/2021  Authorization Period Expiration: 11/03/2022  Plan of Care Expiration: 02/18/2022  Progress Note Due: 2/20/22   Visit # / Visits authorized: 2/20  FOTO: 1/ 3   Precautions: Standard     PTA Visit #: 0/5     Time In: 1345  Time Out: 1433  Total Billable Time: 48 minutes    SUBJECTIVE     Pt reports: that he is feeling better. He reports that he is scheduled for an epidural tomorrow  He was compliant with home exercise program.  Response to previous treatment: good with no soreness  Functional change: standing for longer, can bend down farther with less pain, walking more, and increased tolerance for daily tasks    Pain: 0/10  Location: left back      1/6/22 lumbar spine MRI: "Degenerative disc disease at the lower 2 lumbar levels. Findings most significant at L5-S1 where there is a large left paracentral protrusion likely impinging on the descending left S1 nerve root."     OBJECTIVE     Taken 1/20/22:  B LE MMT:  Hip flexion: R 5/5, L 4+/5  Hip abd: R 5/5, L 5/5  Hip ext: R 5/5, L 4/5  Hip add: R 5/5, L 5/5    Treatment     Cale received the treatments listed below:      Therapeutic exercises to develop strength, ROM, flexibility, posture and core stabilization for 30 minutes including:    Bike 6'  Slump stretch flossing 5 sec hold x 20  Prone windshield wipers x 20  Reverse crunch x 20  Squat holding 10# KBell tapping plyobox 12" x 20  SL RDL tapping plyobox 12" #15 2 x 10 ea  shuttle " kickbacks 1 red strap 2 x 8 ea  Piriformis stretch 3 x 30 sec  Supine sciatic nerve glides 5 sec hold x 20  +Quadruped glute stretch 2 x 30''    Not performed:  Bird-dogs x 20 ea  Prone hip ext x 20  Glut Bridges 3x10 with TB for femoral ER   TA Activation x20,5s  Seated lumbar chair flexion 3x10  x5 min of bike for aerobic conditioning   Lateral Banded Shuffles x5 laps   Hip Flexor Stretch 4x30s  Glut Bridges 3x10    Manual therapy techniques: were applied for 18 minutes including:    Thoracic Mobilization Gd 5  Lumbar Rotational Mobilization Gd 5  Right posterior innominate MET  SL Lumbar Flexion Mobilizations Gd 3  SL lumbar extension mobilization Gd 3     Patient Education and Home Exercises     Home Exercises Provided and Patient Education Provided     Education provided:   - HEP, form, frequency, anatomy    Written Home Exercises Provided: yes. 1/20/22. Exercises were reviewed and Cale was able to demonstrate them prior to the end of the session.  Cale demonstrated good  understanding of the education provided. See EMR under Patient Instructions for exercises provided during therapy sessions    ASSESSMENT   Pt tolerated tx well. Pt was told to ask his doctor to see if it is okay for him to come to therapy the day after his epidural. Therapist educated pt that he is likely required to take a few days off of therapy. 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 As a result, MET correction was attempted, but did not resolve innominate rotation entirely. As a result, Gr V lumbar spine manipulation was performed. After manipulation, pt reported significant relief when performing supine<>sit transfer and active lumbar spine flexion. Pt continued w BLE strengthening. Therapist provided verbal/tactile cues to maintain proper form. Pt reported no adverse effects to exercises. Pt would benefit from continued skilled physical therapy in order to reach pt's goals.     Cale is progressing well towards his goals.   Pt  prognosis is Fair.     Pt will continue to benefit from skilled outpatient physical therapy to address the deficits listed in the problem list box on initial evaluation, provide pt/family education and to maximize pt's level of independence in the home and community environment.     Pt's spiritual, cultural and educational needs considered and pt agreeable to plan of care and goals.     Anticipated barriers to physical therapy: Work Requirements    Goals:   Short Term Goals: 4 weeks   1. The patient with report compliance with HEP to maximize functional outcomes.- met 1/20/22  2. The patient will demonstrate increase in BLE MMT by 1/2 grade to improve pt's functional mobility.- met 1/20/22  3. The patient will decrease pain level by 50% in order to improve QOL.- met 1/20/22     Long Term Goals: 8 weeks   1. The patient will demonstrate understanding and performance of advanced HEP to allow for independence once discharged from therapy.- in progress   2. 2. The patient will demonstrate increase in BLE MMT by 1 grade to improve pt's functional mobility.- in progress   3. The patient will report 44 % functional limitation on FOTO to indicate an improvement in overall function.- in progress   4. The patient will be able to julienne/doff shoes & socks w/o pain in order to improve pt's ability to perform ADLs.- in progress   5. Pt will be able to lift 25 lbs off the ground w/o pain in order to improve pt's ability to perform household chores.- in progress   6. Pt will squat 15x w/o pain to return to PLOF.- met 1/20/22    PLAN     Plan of care Certification: 11/22/2021 to 02/18/2022.     Outpatient Physical Therapy 2 times weekly for 6 weeks to include the following interventions: Cervical/Lumbar Traction, Electrical Stimulation Dry Needling, Gait Training, Manual Therapy, Moist Heat/ Ice, Neuromuscular Re-ed, Patient Education, Self Care, Therapeutic Activites, Therapeutic Exercise and Ultrasound.     Progress lumbar  stabilization and LE strength.    Enrique Del Cid, PT

## 2022-02-01 NOTE — PROGRESS NOTES
CBC with leukopenia seen previously.  Suspect this is genetic  TSH, free T4 normal  A1c normal  CMP normal  Lipid normal  Results to patient via [employer].  No changes.

## 2022-02-02 ENCOUNTER — OFFICE VISIT (OUTPATIENT)
Dept: PAIN MEDICINE | Facility: CLINIC | Age: 37
End: 2022-02-02
Payer: COMMERCIAL

## 2022-02-02 DIAGNOSIS — M51.36 DDD (DEGENERATIVE DISC DISEASE), LUMBAR: Primary | Chronic | ICD-10-CM

## 2022-02-02 DIAGNOSIS — M54.16 LUMBAR RADICULOPATHY: ICD-10-CM

## 2022-02-02 PROCEDURE — 3044F HG A1C LEVEL LT 7.0%: CPT | Mod: CPTII,S$GLB,, | Performed by: REGISTERED NURSE

## 2022-02-02 PROCEDURE — 99214 OFFICE O/P EST MOD 30 MIN: CPT | Mod: S$GLB,,, | Performed by: REGISTERED NURSE

## 2022-02-02 PROCEDURE — 99999 PR PBB SHADOW E&M-EST. PATIENT-LVL I: ICD-10-PCS | Mod: PBBFAC,,, | Performed by: REGISTERED NURSE

## 2022-02-02 PROCEDURE — 99214 PR OFFICE/OUTPT VISIT, EST, LEVL IV, 30-39 MIN: ICD-10-PCS | Mod: S$GLB,,, | Performed by: REGISTERED NURSE

## 2022-02-02 PROCEDURE — 99999 PR PBB SHADOW E&M-EST. PATIENT-LVL I: CPT | Mod: PBBFAC,,, | Performed by: REGISTERED NURSE

## 2022-02-02 PROCEDURE — 3044F PR MOST RECENT HEMOGLOBIN A1C LEVEL <7.0%: ICD-10-PCS | Mod: CPTII,S$GLB,, | Performed by: REGISTERED NURSE

## 2022-02-02 NOTE — PROGRESS NOTES
Subjective:     Patient ID: Cale Steiner is a 36 y.o. male    Chief Complaint: No chief complaint on file.      Referred by: No ref. provider found      HPI:    Disclaimer: This note was generated using voice recognition software.  There may be typographical errors that were missed during proofreading.          Interval History NP (02/07/22):    Pt returns today for follow up. He was seen by Neurosurgery and was deemed not a surgical candidate at this time. He would like to proceed with interventional procedures. His pain remains unchanged in quality and location since last encounter.     Interval History NP (1/11/22):    Pt returns today for follow up and imaging review. He reports that his pain continues to increase in the left lower extremity, mainly anterior thigh. Pain is sever at times and does affect his daily life, sleep, etc. He is at his worst a 9/10 on there pain scale. He has continued a HEP for years. He denies any new numbness, weakness, or tingling. He also denies bowel or bladder dysfunction. He does not wish to discuss interventional procedures at this time, but would prefer a referral to neurosurgery.     Interval History NP (01/06/22):    Pt returns today for follow up. He reports that PT has been helpful but he continues to have left lower extremity pain. The pain is now less intense and no longer constant, but still bothersome. He continues gabapentin, but is not consistent with dosing. He denies any adverse effects. He also takes ibuprofen and tylenol prn for pain with good relief. He denies any new or worsening symptoms.     Initial Encounter (11/3/21):  Cale Steiner is a 36 y.o. male who presents today with left-sided buttock and lower extremity pain.  Patient has had this pain intermittently for years.  Episodes usually self-limited and mild.  He states that his current episode started a few weeks ago.  Is more severe than previous episodes and has not improved..  The pain is located  in the left lumbosacral/gluteal region.  The pain will radiate to left posterior thigh.  He denies any associated numbness, tingling, weakness, bowel bladder dysfunction.  Pain is constant and worsened with activity.  Pain is also worse at night when lying down..   This pain is described in detail below.    Physical Therapy:  No, but does maintain a daily HEP.     Non-pharmacologic Treatment:  Rest helps         · TENS?  No    Pain Medications:         · Currently taking:  Meloxicam    · Has tried in the past:  NSAIDs, Tylenol    · Has not tried:  Opioids, Muscle relaxants, TCAs, SNRIs, anticonvulsants, topical creams    Blood thinners:  None    Interventional Therapies:  None    Relevant Surgeries:  None    Affecting sleep?  Yes    Affecting daily activities? yes    Depressive symptoms? no          · SI/HI? No    Work status: Employed    Pain Scores:    Best:       2/10  Worst:     8/10  Usually:  2/10  Today:   2/10    Review of Systems   Constitutional: Negative for activity change, appetite change, chills, fatigue, fever and unexpected weight change.   HENT: Negative for hearing loss.    Eyes: Negative for visual disturbance.   Respiratory: Negative for chest tightness and shortness of breath.    Cardiovascular: Negative for chest pain.   Gastrointestinal: Negative for abdominal pain, constipation, diarrhea, nausea and vomiting.   Genitourinary: Negative for difficulty urinating.   Musculoskeletal: Positive for back pain, gait problem and myalgias. Negative for neck pain.   Skin: Negative for rash.   Neurological: Negative for dizziness, weakness, light-headedness, numbness and headaches.   Psychiatric/Behavioral: Positive for sleep disturbance. Negative for hallucinations and suicidal ideas. The patient is not nervous/anxious.        Past Medical History:   Diagnosis Date    Lumbar spondylosis 11/3/2021       Past Surgical History:   Procedure Laterality Date    thumb surgery         Social History      Socioeconomic History    Marital status:    Tobacco Use    Smoking status: Never Smoker    Smokeless tobacco: Never Used   Substance and Sexual Activity    Alcohol use: No    Drug use: No    Sexual activity: Yes     Partners: Female       Review of patient's allergies indicates:  No Known Allergies    Current Outpatient Medications on File Prior to Visit   Medication Sig Dispense Refill    gabapentin (NEURONTIN) 300 MG capsule Take 2 capsules (600 mg total) by mouth 3 (three) times daily. 180 capsule 5    naproxen (NAPROSYN) 500 MG tablet Take 1 tablet (500 mg total) by mouth 2 (two) times daily as needed. 60 tablet 1     No current facility-administered medications on file prior to visit.       Objective:      There were no vitals taken for this visit.    Exam:  GEN:  Well developed, well nourished.  No acute distress.  Normal pain behavior.  HEENT:  No trauma.  Mucous membranes moist.  Nares patent bilaterally.  PSYCH: Normal affect. Thought content appropriate.  CHEST:  Breathing symmetric.  No audible wheezing.  ABD: Soft, non-distended.  SKIN:  Warm, pink, dry.  No rash on exposed areas.    EXT:  No cyanosis, clubbing, or edema.  No color change or changes in nail or hair growth.  NEURO/MUSCULOSKELETAL:  Fully alert, oriented, and appropriate. Speech normal renato. No cranial nerve deficits.   Gait:  Normal.  No trendelenburg sign bilaterally.           Imaging:    Narrative & Impression  EXAMINATION:  MRI LUMBAR SPINE WITHOUT CONTRAST     CLINICAL HISTORY:  Lumbar radiculopathy, no red flags, no prior management; Radiculopathy, lumbar region     TECHNIQUE:  Multiplanar, multisequence MR images were acquired from the thoracolumbar junction to the sacrum without the administration of contrast.     COMPARISON:  11/03/2021     FINDINGS:  Straightening of the normal lumbar lordosis.  Otherwise, sagittal alignment is maintained.  Conus terminates at L1.  Visualized cord signal is within normal  limits.  There is disc desiccation and mild height loss at L4-5 and L5-S1.  The remaining disc heights are maintained.  Vertebral body heights are maintained.  No evidence of fracture or marrow replacement process.  Visualized retroperitoneal structures show no significant abnormalities.     T12-L1: No significant central canal stenosis or neural foraminal narrowing.     L1-2: No significant central canal stenosis or neural foraminal narrowing.     L2-3, L3-4: Mild broad-based disc bulge with mild facet arthropathy.  No significant central canal stenosis or neural foraminal narrowing.     L4-5: There is a diffuse disc bulge with a superimposed small right paracentral protrusion and annular fissure.  Mild facet arthropathy.  Mild lateral recess stenosis but overall no significant central canal stenosis or neural foraminal narrowing.     L5-S1: Diffuse disc bulge with an annular fissure with superimposed large left paracentral protrusion measuring 2.1 cm wide by 0.9 cm in AP diameter.  This causes left lateral recess stenosis and overall moderate central canal stenosis.  This likely impinges on the descending left S1 nerve root.  Given the size, this may impinge on the other descending nerve roots as well.  There is mild facet arthropathy with small facet effusions.  Mild neural foraminal narrowing.     Impression:     Degenerative disc disease at the lower 2 lumbar levels.  Findings most significant at L5-S1 where there is a large left paracentral protrusion likely impinging on the descending left S1 nerve root.  Specific details at each level above.     This report was flagged in Epic as abnormal.        Electronically signed by: Anastacio Fien MD  Date:                                            01/07/2022  Time:                                           08:22    Narrative & Impression  EXAMINATION:  XR LUMBAR SPINE AP AND LAT WITH FLEX/EXT     CLINICAL HISTORY:  Other intervertebral disc degeneration, lumbar  region     TECHNIQUE:  AP and lateral views as well as lateral flexion and extension images are performed through the lumbar spine.     COMPARISON:  None     FINDINGS:  Slight straightening of the expected normal lumbar lordosis with trace grade 1 retrolisthesis of L2 on L3 and L4 on L5 with neutral and extension positioning.  There is partial reduction of retrolisthesis at L2/L3 with flexion positioning..  Lumbar vertebral body heights and contours are within normal limits without evidence for acute fracture.  Slight convex left curvature lumbar spine.  Further evaluation as warranted clinically.     Impression:     Please see above        Electronically signed by: Ulysses Becker DO  Date:                                            11/03/2021  Time:                                           15:58    Assessment:       Encounter Diagnoses   Name Primary?    DDD (degenerative disc disease), lumbar Yes    Lumbar radiculopathy          Plan:       Diagnoses and all orders for this visit:    DDD (degenerative disc disease), lumbar    Lumbar radiculopathy        Cale Steiner is a 36 y.o. male with acute exacerbation of chronic intermittent left-sided low back and lower extremity pain.  Likely from lumbar degenerative disc disease and possibly nerve root impingement.  Not having overt signs of radiculopathy but is having radiating pain to lower extremity concerning for nerve root irritation..    1.  Schedule for left L5 and S1 TESI. Consented today, risks and benefits of procedure discussed.   2.  Continue gabapentin 600 mg t.i.d.  3.  RTC 2 weeks post procedure to discuss results.               The above plan and management options were discussed at length with patient. Patient is in agreement with the above and verbalized understanding.    CHUY López, FNP-C  [facility]  Interventional Pain Management

## 2022-02-02 NOTE — H&P (VIEW-ONLY)
Subjective:     Patient ID: Cale Steiner is a 36 y.o. male    Chief Complaint: No chief complaint on file.      Referred by: No ref. provider found      HPI:    Disclaimer: This note was generated using voice recognition software.  There may be typographical errors that were missed during proofreading.          Interval History NP (02/07/22):    Pt returns today for follow up. He was seen by Neurosurgery and was deemed not a surgical candidate at this time. He would like to proceed with interventional procedures. His pain remains unchanged in quality and location since last encounter.     Interval History NP (1/11/22):    Pt returns today for follow up and imaging review. He reports that his pain continues to increase in the left lower extremity, mainly anterior thigh. Pain is sever at times and does affect his daily life, sleep, etc. He is at his worst a 9/10 on there pain scale. He has continued a HEP for years. He denies any new numbness, weakness, or tingling. He also denies bowel or bladder dysfunction. He does not wish to discuss interventional procedures at this time, but would prefer a referral to neurosurgery.     Interval History NP (01/06/22):    Pt returns today for follow up. He reports that PT has been helpful but he continues to have left lower extremity pain. The pain is now less intense and no longer constant, but still bothersome. He continues gabapentin, but is not consistent with dosing. He denies any adverse effects. He also takes ibuprofen and tylenol prn for pain with good relief. He denies any new or worsening symptoms.     Initial Encounter (11/3/21):  Cale Steiner is a 36 y.o. male who presents today with left-sided buttock and lower extremity pain.  Patient has had this pain intermittently for years.  Episodes usually self-limited and mild.  He states that his current episode started a few weeks ago.  Is more severe than previous episodes and has not improved..  The pain is located  in the left lumbosacral/gluteal region.  The pain will radiate to left posterior thigh.  He denies any associated numbness, tingling, weakness, bowel bladder dysfunction.  Pain is constant and worsened with activity.  Pain is also worse at night when lying down..   This pain is described in detail below.    Physical Therapy:  No, but does maintain a daily HEP.     Non-pharmacologic Treatment:  Rest helps         · TENS?  No    Pain Medications:         · Currently taking:  Meloxicam    · Has tried in the past:  NSAIDs, Tylenol    · Has not tried:  Opioids, Muscle relaxants, TCAs, SNRIs, anticonvulsants, topical creams    Blood thinners:  None    Interventional Therapies:  None    Relevant Surgeries:  None    Affecting sleep?  Yes    Affecting daily activities? yes    Depressive symptoms? no          · SI/HI? No    Work status: Employed    Pain Scores:    Best:       2/10  Worst:     8/10  Usually:  2/10  Today:   2/10    Review of Systems   Constitutional: Negative for activity change, appetite change, chills, fatigue, fever and unexpected weight change.   HENT: Negative for hearing loss.    Eyes: Negative for visual disturbance.   Respiratory: Negative for chest tightness and shortness of breath.    Cardiovascular: Negative for chest pain.   Gastrointestinal: Negative for abdominal pain, constipation, diarrhea, nausea and vomiting.   Genitourinary: Negative for difficulty urinating.   Musculoskeletal: Positive for back pain, gait problem and myalgias. Negative for neck pain.   Skin: Negative for rash.   Neurological: Negative for dizziness, weakness, light-headedness, numbness and headaches.   Psychiatric/Behavioral: Positive for sleep disturbance. Negative for hallucinations and suicidal ideas. The patient is not nervous/anxious.        Past Medical History:   Diagnosis Date    Lumbar spondylosis 11/3/2021       Past Surgical History:   Procedure Laterality Date    thumb surgery         Social History      Socioeconomic History    Marital status:    Tobacco Use    Smoking status: Never Smoker    Smokeless tobacco: Never Used   Substance and Sexual Activity    Alcohol use: No    Drug use: No    Sexual activity: Yes     Partners: Female       Review of patient's allergies indicates:  No Known Allergies    Current Outpatient Medications on File Prior to Visit   Medication Sig Dispense Refill    gabapentin (NEURONTIN) 300 MG capsule Take 2 capsules (600 mg total) by mouth 3 (three) times daily. 180 capsule 5    naproxen (NAPROSYN) 500 MG tablet Take 1 tablet (500 mg total) by mouth 2 (two) times daily as needed. 60 tablet 1     No current facility-administered medications on file prior to visit.       Objective:      There were no vitals taken for this visit.    Exam:  GEN:  Well developed, well nourished.  No acute distress.  Normal pain behavior.  HEENT:  No trauma.  Mucous membranes moist.  Nares patent bilaterally.  PSYCH: Normal affect. Thought content appropriate.  CHEST:  Breathing symmetric.  No audible wheezing.  ABD: Soft, non-distended.  SKIN:  Warm, pink, dry.  No rash on exposed areas.    EXT:  No cyanosis, clubbing, or edema.  No color change or changes in nail or hair growth.  NEURO/MUSCULOSKELETAL:  Fully alert, oriented, and appropriate. Speech normal renato. No cranial nerve deficits.   Gait:  Normal.  No trendelenburg sign bilaterally.           Imaging:    Narrative & Impression  EXAMINATION:  MRI LUMBAR SPINE WITHOUT CONTRAST     CLINICAL HISTORY:  Lumbar radiculopathy, no red flags, no prior management; Radiculopathy, lumbar region     TECHNIQUE:  Multiplanar, multisequence MR images were acquired from the thoracolumbar junction to the sacrum without the administration of contrast.     COMPARISON:  11/03/2021     FINDINGS:  Straightening of the normal lumbar lordosis.  Otherwise, sagittal alignment is maintained.  Conus terminates at L1.  Visualized cord signal is within normal  limits.  There is disc desiccation and mild height loss at L4-5 and L5-S1.  The remaining disc heights are maintained.  Vertebral body heights are maintained.  No evidence of fracture or marrow replacement process.  Visualized retroperitoneal structures show no significant abnormalities.     T12-L1: No significant central canal stenosis or neural foraminal narrowing.     L1-2: No significant central canal stenosis or neural foraminal narrowing.     L2-3, L3-4: Mild broad-based disc bulge with mild facet arthropathy.  No significant central canal stenosis or neural foraminal narrowing.     L4-5: There is a diffuse disc bulge with a superimposed small right paracentral protrusion and annular fissure.  Mild facet arthropathy.  Mild lateral recess stenosis but overall no significant central canal stenosis or neural foraminal narrowing.     L5-S1: Diffuse disc bulge with an annular fissure with superimposed large left paracentral protrusion measuring 2.1 cm wide by 0.9 cm in AP diameter.  This causes left lateral recess stenosis and overall moderate central canal stenosis.  This likely impinges on the descending left S1 nerve root.  Given the size, this may impinge on the other descending nerve roots as well.  There is mild facet arthropathy with small facet effusions.  Mild neural foraminal narrowing.     Impression:     Degenerative disc disease at the lower 2 lumbar levels.  Findings most significant at L5-S1 where there is a large left paracentral protrusion likely impinging on the descending left S1 nerve root.  Specific details at each level above.     This report was flagged in Epic as abnormal.        Electronically signed by: Anastacio Fine MD  Date:                                            01/07/2022  Time:                                           08:22    Narrative & Impression  EXAMINATION:  XR LUMBAR SPINE AP AND LAT WITH FLEX/EXT     CLINICAL HISTORY:  Other intervertebral disc degeneration, lumbar  region     TECHNIQUE:  AP and lateral views as well as lateral flexion and extension images are performed through the lumbar spine.     COMPARISON:  None     FINDINGS:  Slight straightening of the expected normal lumbar lordosis with trace grade 1 retrolisthesis of L2 on L3 and L4 on L5 with neutral and extension positioning.  There is partial reduction of retrolisthesis at L2/L3 with flexion positioning..  Lumbar vertebral body heights and contours are within normal limits without evidence for acute fracture.  Slight convex left curvature lumbar spine.  Further evaluation as warranted clinically.     Impression:     Please see above        Electronically signed by: Ulysses Becker DO  Date:                                            11/03/2021  Time:                                           15:58    Assessment:       Encounter Diagnoses   Name Primary?    DDD (degenerative disc disease), lumbar Yes    Lumbar radiculopathy          Plan:       Diagnoses and all orders for this visit:    DDD (degenerative disc disease), lumbar    Lumbar radiculopathy        Cale Steiner is a 36 y.o. male with acute exacerbation of chronic intermittent left-sided low back and lower extremity pain.  Likely from lumbar degenerative disc disease and possibly nerve root impingement.  Not having overt signs of radiculopathy but is having radiating pain to lower extremity concerning for nerve root irritation..    1.  Schedule for left L5 and S1 TESI. Consented today, risks and benefits of procedure discussed.   2.  Continue gabapentin 600 mg t.i.d.  3.  RTC 2 weeks post procedure to discuss results.               The above plan and management options were discussed at length with patient. Patient is in agreement with the above and verbalized understanding.    CHUY López, FNP-C  [facility]  Interventional Pain Management

## 2022-02-03 ENCOUNTER — TELEPHONE (OUTPATIENT)
Dept: PAIN MEDICINE | Facility: CLINIC | Age: 37
End: 2022-02-03
Payer: COMMERCIAL

## 2022-02-03 ENCOUNTER — CLINICAL SUPPORT (OUTPATIENT)
Dept: REHABILITATION | Facility: HOSPITAL | Age: 37
End: 2022-02-03
Attending: PAIN MEDICINE
Payer: COMMERCIAL

## 2022-02-03 DIAGNOSIS — M51.36 DDD (DEGENERATIVE DISC DISEASE), LUMBAR: Primary | ICD-10-CM

## 2022-02-03 DIAGNOSIS — M54.16 LUMBAR RADICULOPATHY: ICD-10-CM

## 2022-02-03 PROCEDURE — 97110 THERAPEUTIC EXERCISES: CPT | Mod: PN

## 2022-02-03 PROCEDURE — 97140 MANUAL THERAPY 1/> REGIONS: CPT | Mod: PN

## 2022-02-03 NOTE — TELEPHONE ENCOUNTER
Mr. Madsen would like to schedule the left L5 + S1 TF ANEL on 2/25/2022- next available.  Requested arrival time of 1400.  Instructions sent via [employer].

## 2022-02-07 NOTE — PROGRESS NOTES
"[facility]   Physical Therapy Treatment Note     Name: Cale Steiner  Clinic Number: [id number]    Therapy Diagnosis:   Encounter Diagnoses   Name Primary?    DDD (degenerative disc disease), lumbar Yes    Lumbar radiculopathy      Physician: Ferny Vences Jr*    Visit Date: 2/3/2022    Physician Orders: PT Eval and Treat   Medical Diagnosis from Referral: DDD (degenerative disc disease), lumbar [M51.36], Lumbar spondylosis [M47.816], Lumbar radiculopathy [M54.16]  Evaluation Date: 11/22/2021  Authorization Period Expiration: 11/03/2022  Plan of Care Expiration: 02/18/2022  Progress Note Due: 12/22/2021  Visit # / Visits authorized: 11/pending   FOTO: 1/ 3   Precautions: Standard     PTA Visit #: 0/5     Time In: 11:45AM  Time Out: 12:45PM  Total Billable Time: 30 minutes    SUBJECTIVE     Pt reports: "It's feeling way better today"               He was compliant with home exercise program.  Response to previous treatment: soreness  Functional change: Improvement in walking speed     Pain: 2/10  Location: left back      OBJECTIVE     N/A    Treatment     Cale received the treatments listed below:      therapeutic exercises to develop strength, ROM, flexibility, posture and core stabilization for 30 minutes including:    Glut Bridges 3x10 with TB for femoral ER   TA Activation x20,5s  Seated lumbar chair flexion 3x10  x5 min of bike for aerobic conditioning   Lateral Banded Shuffles x5 laps   Hip Flexor Stretch 4x30s  Glut Bridges 3x10  Low Ab activation x20  Low ab activation +glut bridge 3x10    manual therapy techniques: were applied for 15 minutes, including:    Thoracic Mobilization Gd 5  Hip Extension Mobilization Gd 3/4               Patient Education and Home Exercises     Home Exercises Provided and Patient Education Provided     Education provided:   - HEP, form, frequency, anatomy    Written Home Exercises Provided: Patient instructed to cont prior HEP. Exercises were " reviewed and Cale was able to demonstrate them prior to the end of the session.  Cale demonstrated good  understanding of the education provided. See EMR under Patient Instructions for exercises provided during therapy sessions    ASSESSMENT     Patients radicular symptoms have significantly decreased on this date. He is able to reverse his lumbar lordotic curve today. He still demonstrates some residual neural tension that requires him to passively flex his knee when bending over. Low abdominal activation was successful in improving this movement action.       Cale is progressing well towards his goals.   Pt prognosis is Fair.     Pt will continue to benefit from skilled outpatient physical therapy to address the deficits listed in the problem list box on initial evaluation, provide pt/family education and to maximize pt's level of independence in the home and community environment.     Pt's spiritual, cultural and educational needs considered and pt agreeable to plan of care and goals.     Anticipated barriers to physical therapy: Work Requirements    Goals:   Short Term Goals: 4 weeks   1. The patient with report compliance with HEP to maximize functional outcomes.  2. The patient will demonstrate increase in BLE MMT by 1/2 grade to improve pt's functional mobility  3. The patient will decrease pain level by 50% in order to improve QOL.     Long Term Goals: 8 weeks   1. The patient will demonstrate understanding and performance of advanced HEP to allow for independence once discharged from therapy.   2. 2. The patient will demonstrate increase in BLE MMT by 1 grade to improve pt's functional mobility  3. The patient will report 44 % functional limitation on FOTO to indicate an improvement in overall function.  4. The patient will be able to julienne/doff shoes & socks w/o pain in order to improve pt's ability to perform ADLs.  5. Pt will be able to lift 25 lbs off the ground w/o pain in order to improve pt's ability to  perform household chores.  6. Pt will squat 15x w/o pain to return to PLOF.    PLAN     Plan of care Certification: 11/22/2021 to 02/18/2022.     Outpatient Physical Therapy 2 times weekly for 6 weeks to include the following interventions: Cervical/Lumbar Traction, Electrical Stimulation Dry Needling, Gait Training, Manual Therapy, Moist Heat/ Ice, Neuromuscular Re-ed, Patient Education, Self Care, Therapeutic Activites, Therapeutic Exercise and Ultrasound.     Jamaal Diallo, PT

## 2022-02-08 ENCOUNTER — CLINICAL SUPPORT (OUTPATIENT)
Dept: REHABILITATION | Facility: HOSPITAL | Age: 37
End: 2022-02-08
Attending: PAIN MEDICINE
Payer: COMMERCIAL

## 2022-02-08 DIAGNOSIS — M51.36 DDD (DEGENERATIVE DISC DISEASE), LUMBAR: Primary | ICD-10-CM

## 2022-02-08 PROCEDURE — 97110 THERAPEUTIC EXERCISES: CPT | Mod: PN,CQ

## 2022-02-08 NOTE — PROGRESS NOTES
"  [facility]   Physical Therapy Treatment Note     Name: Cale Steiner  Clinic Number: [id number]    Therapy Diagnosis:   Encounter Diagnosis   Name Primary?    DDD (degenerative disc disease), lumbar Yes     Physician: Ferny Vences Jr*    Visit Date: 2/8/2022    Physician Orders: PT Eval and Treat   Medical Diagnosis from Referral: DDD (degenerative disc disease), lumbar [M51.36], Lumbar spondylosis [M47.816], Lumbar radiculopathy [M54.16]  Evaluation Date: 11/22/2021  Authorization Period Expiration: 11/03/2022  Plan of Care Expiration: 02/18/2022  Progress Note Due: 12/22/2021  Visit # / Visits authorized: 11/pending   FOTO: 1/ 3   Precautions: Standard     PTA Visit #: 1/5     Time In: 1:04  Time Out: 2:00  Total Billable Time: 45 minutes    SUBJECTIVE     Pt reports: no longer having pain down LE              He was compliant with home exercise program.  Response to previous treatment: soreness  Functional change: Improvement in walking speed     Pain: 2/10  Location: left back      OBJECTIVE     N/A    Treatment     Cale received the treatments listed below:      therapeutic exercises to develop strength, ROM, flexibility, posture and core stabilization for 45 minutes including:    Slump stretch flossing 5 sec hold x 20  Prone windshield wipers x 20  Reverse crunch x 20  Squat holding 10# KBell tapping plyobox 12" x 20  SL RDL tapping plyobox 12" #10 x 20 ea  shuttle kickbacks 1 red strap 2 x 8 ea  Piriformis stretch 3 x 30 sec  Supine sciatic nerve glides 5 sec hold x 20      manual therapy techniques: were applied for 00 minutes, including:    Thoracic Mobilization Gd 5  Hip Extension Mobilization Gd 3/4               Patient Education and Home Exercises     Home Exercises Provided and Patient Education Provided     Education provided:   - HEP, form, frequency, anatomy    Written Home Exercises Provided: Patient instructed to cont prior HEP. Exercises were reviewed and " Cale was able to demonstrate them prior to the end of the session.  Cale demonstrated good  understanding of the education provided. See EMR under Patient Instructions for exercises provided during therapy sessions    ASSESSMENT     Cale tolerated session very well. Reports reduction in radicular symptoms recently. Pt challenged with core and single leg strengthening. Cueing required for body mechanics. Continues to achieve significant stretch with R piriformis stretching and good tolerance to nerve glides.      Cale is progressing well towards his goals.   Pt prognosis is Fair.     Pt will continue to benefit from skilled outpatient physical therapy to address the deficits listed in the problem list box on initial evaluation, provide pt/family education and to maximize pt's level of independence in the home and community environment.     Pt's spiritual, cultural and educational needs considered and pt agreeable to plan of care and goals.     Anticipated barriers to physical therapy: Work Requirements    Goals:   Short Term Goals: 4 weeks   1. The patient with report compliance with HEP to maximize functional outcomes.  2. The patient will demonstrate increase in BLE MMT by 1/2 grade to improve pt's functional mobility  3. The patient will decrease pain level by 50% in order to improve QOL.     Long Term Goals: 8 weeks   1. The patient will demonstrate understanding and performance of advanced HEP to allow for independence once discharged from therapy.   2. 2. The patient will demonstrate increase in BLE MMT by 1 grade to improve pt's functional mobility  3. The patient will report 44 % functional limitation on FOTO to indicate an improvement in overall function.  4. The patient will be able to julienne/doff shoes & socks w/o pain in order to improve pt's ability to perform ADLs.  5. Pt will be able to lift 25 lbs off the ground w/o pain in order to improve pt's ability to perform household chores.  6. Pt will squat 15x w/o  pain to return to PLOF.    PLAN     Plan of care Certification: 11/22/2021 to 02/18/2022.     Outpatient Physical Therapy 2 times weekly for 6 weeks to include the following interventions: Cervical/Lumbar Traction, Electrical Stimulation Dry Needling, Gait Training, Manual Therapy, Moist Heat/ Ice, Neuromuscular Re-ed, Patient Education, Self Care, Therapeutic Activites, Therapeutic Exercise and Ultrasound.     Elizabeth Cowan, PTA

## 2022-02-10 ENCOUNTER — DOCUMENTATION ONLY (OUTPATIENT)
Dept: REHABILITATION | Facility: HOSPITAL | Age: 37
End: 2022-02-10
Payer: COMMERCIAL

## 2022-02-10 NOTE — PROGRESS NOTES
Pt no showed to his appointment today. Therapist called and left a VM.     Enrique Del Cid PT, DPT

## 2022-02-17 ENCOUNTER — CLINICAL SUPPORT (OUTPATIENT)
Dept: REHABILITATION | Facility: HOSPITAL | Age: 37
End: 2022-02-17
Attending: PAIN MEDICINE
Payer: COMMERCIAL

## 2022-02-17 DIAGNOSIS — M51.36 DDD (DEGENERATIVE DISC DISEASE), LUMBAR: Primary | Chronic | ICD-10-CM

## 2022-02-17 DIAGNOSIS — M54.16 LUMBAR RADICULOPATHY: ICD-10-CM

## 2022-02-17 PROCEDURE — 97110 THERAPEUTIC EXERCISES: CPT | Mod: PN

## 2022-02-17 NOTE — PROGRESS NOTES
"  [facility]   Physical Therapy Treatment Note     Name: Cale Steiner  Clinic Number: [id number]    Therapy Diagnosis:   Encounter Diagnoses   Name Primary?    DDD (degenerative disc disease), lumbar Yes    Lumbar radiculopathy      Physician: Ferny Vences Jr*    Visit Date: 2/17/2022    Physician Orders: PT Eval and Treat   Medical Diagnosis from Referral: DDD (degenerative disc disease), lumbar [M51.36], Lumbar spondylosis [M47.816], Lumbar radiculopathy [M54.16]  Evaluation Date: 11/22/2021  Authorization Period Expiration: 11/03/2022  Plan of Care Expiration: 03/18/2022  Progress Note Due: 03/17/2022  Visit # / Visits authorized: 5/20   FOTO: 1/ 3   Precautions: Standard      PTA Visit #: 0/5     Time In: 1515  Time Out: 1600  Total Billable Time: 45 minutes    SUBJECTIVE     Pt reports: no longer having pain down LE              He was compliant with home exercise program.  Response to previous treatment: soreness  Functional change: Improvement in walking speed     Pain: 2/10  Location: left back      OBJECTIVE   Last Reassessment: 02/17/2022  Lumbar Range of Motion:     Degrees Pain   Flexion WFL    Stretch into Left buttocks      Extension 70%    No pain         Left Side Bending Knee No pain         Right Side Bending Knee No Pain         Left rotation    WFL Feels "different"      Right Rotation    WFL No pain               Lower Extremity Strength  Right LE   Left LE     Knee extension: 5/5 Knee extension: 4+/5   Knee flexion: 5/5 Knee flexion: 5/5   Hip flexion: 5/5 Hip flexion: 4+/5   Hip extension:  5/5 Hip extension: 5/5   Hip abduction: 4+/5 Hip abduction: 4+/5   Hip adduction: 5/5 Hip adduction 5/5   Ankle dorsiflexion: 5/5 Ankle dorsiflexion: 5/5   Ankle plantarflexion: 5/5 Ankle plantarflexion: 5/5            Special Tests:  -Compression: (-)  -Distraction: NT  -Chatman: R = (-)               L = (-)   -Scour: R = (-)               L = (-)  -Quadrant: R = (-)   " "                  L = (-)  -SLR:   R = (+) felt pain into LLE              L = (+) pain into LLE  -SIJ Compression: (-)  -SIJ Distraction: (-)  -Leg Length Discrepancy: (-)     Neuro Dynamic Testing:               Sciatic nerve:                                       Slump: R = (-)                                      L = (+)      Treatment     Cale received the treatments listed below:      therapeutic exercises to develop strength, ROM, flexibility, posture and core stabilization for 45 minutes including:    Progress Note Measurements  Bike 6'  Slump stretch flossing 5 sec hold x 20  Prone windshield wipers x 20  Reverse crunch x 20  Squat holding 20# KBell tapping plyobox 12" 2 x 10  SL RDL tapping plyobox 12" #20 2 x 8 ea  shuttle kickbacks 1 red strap 2 x 8 ea  Piriformis stretch 3 x 30 sec  Supine sciatic nerve glides 5 sec hold x 20  +Hexbar Deadlift 55# 2 x 8  +Core Rotations on SB 2 x 10      manual therapy techniques: were applied for 00 minutes, including:    Thoracic Mobilization Gd 5  Hip Extension Mobilization Gd 3/4               Patient Education and Home Exercises     Home Exercises Provided and Patient Education Provided     Education provided:   - HEP, form, frequency, anatomy    Written Home Exercises Provided: Patient instructed to cont prior HEP. Exercises were reviewed and Cale was able to demonstrate them prior to the end of the session.  Cale demonstrated good  understanding of the education provided. See EMR under Patient Instructions for exercises provided during therapy sessions    ASSESSMENT     Pt tolerated tx well. Pt displays improvement in his strength, range of motion, and pain levels. Pt was told that if his pain does not return, pt may be D/C in his next week of therapy in order to provide a printed HEP and progression of exercises. Pt was progressed with greater compound exercises in order to improve lumbar spine and pelvic stability. Therapist provided verbal/tactile cues to " maintain proper form. Pt reported no adverse effects to exercises. Pt would benefit from continued skilled physical therapy in order to reach pt's goals.     Cale is progressing well towards his goals.   Pt prognosis is Fair.     Pt will continue to benefit from skilled outpatient physical therapy to address the deficits listed in the problem list box on initial evaluation, provide pt/family education and to maximize pt's level of independence in the home and community environment.     Pt's spiritual, cultural and educational needs considered and pt agreeable to plan of care and goals.     Anticipated barriers to physical therapy: Work Requirements    Goals:   Short Term Goals: 4 weeks   1. The patient with report compliance with HEP to maximize functional outcomes. Met  2. The patient will demonstrate increase in BLE MMT by 1/2 grade to improve pt's functional mobility Met  3. The patient will decrease pain level by 50% in order to improve QOL. Met     Long Term Goals: 8 weeks   1. The patient will demonstrate understanding and performance of advanced HEP to allow for independence once discharged from therapy.  Met  2. 2. The patient will demonstrate increase in BLE MMT by 1 grade to improve pt's functional mobility Met  3. The patient will report 44 % functional limitation on FOTO to indicate an improvement in overall function.  4. The patient will be able to julienne/doff shoes & socks w/o pain in order to improve pt's ability to perform ADLs. Met  5. Pt will be able to lift 25 lbs off the ground w/o pain in order to improve pt's ability to perform household chores. Met  6. Pt will squat 15x w/o pain to return to PLOF. Met    PLAN     Plan of care Certification: 11/22/2021 to 03/18/2022.     Outpatient Physical Therapy 2 times weekly for 6 weeks to include the following interventions: Cervical/Lumbar Traction, Electrical Stimulation Dry Needling, Gait Training, Manual Therapy, Moist Heat/ Ice, Neuromuscular Re-ed,  Patient Education, Self Care, Therapeutic Activites, Therapeutic Exercise and Ultrasound.     Enrique Del Cid, PT

## 2022-02-23 ENCOUNTER — HOSPITAL ENCOUNTER (OUTPATIENT)
Facility: HOSPITAL | Age: 37
Discharge: HOME | End: 2022-02-23
Attending: PAIN MEDICINE | Admitting: PAIN MEDICINE
Payer: COMMERCIAL

## 2022-02-23 VITALS
HEIGHT: 71 IN | DIASTOLIC BLOOD PRESSURE: 62 MMHG | OXYGEN SATURATION: 96 % | RESPIRATION RATE: 16 BRPM | BODY MASS INDEX: 23.1 KG/M2 | TEMPERATURE: 98 F | HEART RATE: 60 BPM | SYSTOLIC BLOOD PRESSURE: 123 MMHG | WEIGHT: 165 LBS

## 2022-02-23 DIAGNOSIS — M51.36 DDD (DEGENERATIVE DISC DISEASE), LUMBAR: Primary | Chronic | ICD-10-CM

## 2022-02-23 PROCEDURE — 25500020 PHARM REV CODE 255: Performed by: PAIN MEDICINE

## 2022-02-23 PROCEDURE — 64484 NJX AA&/STRD TFRM EPI L/S EA: CPT | Performed by: PAIN MEDICINE

## 2022-02-23 PROCEDURE — 64483 PR EPIDURAL INJ, ANES/STEROID, TRANSFORAMINAL, LUMB/SACR, SNGL LEVL: ICD-10-PCS | Mod: LT,,, | Performed by: PAIN MEDICINE

## 2022-02-23 PROCEDURE — 64484 NJX AA&/STRD TFRM EPI L/S EA: CPT | Mod: LT,,, | Performed by: PAIN MEDICINE

## 2022-02-23 PROCEDURE — 64483 NJX AA&/STRD TFRM EPI L/S 1: CPT | Performed by: PAIN MEDICINE

## 2022-02-23 PROCEDURE — 64483 NJX AA&/STRD TFRM EPI L/S 1: CPT | Mod: LT,,, | Performed by: PAIN MEDICINE

## 2022-02-23 PROCEDURE — 64484 PRA INJECT ANES/STEROID FORAMEN LUMBAR/SACRAL W IMG GUIDE ,EA ADD LEVEL: ICD-10-PCS | Mod: LT,,, | Performed by: PAIN MEDICINE

## 2022-02-23 PROCEDURE — 63600175 PHARM REV CODE 636 W HCPCS: Performed by: PAIN MEDICINE

## 2022-02-23 PROCEDURE — 25000003 PHARM REV CODE 250: Performed by: PAIN MEDICINE

## 2022-02-23 RX ORDER — DEXAMETHASONE SODIUM PHOSPHATE 10 MG/ML
INJECTION INTRAMUSCULAR; INTRAVENOUS
Status: DISCONTINUED
Start: 2022-02-23 | End: 2022-02-23 | Stop reason: HOSPADM

## 2022-02-23 RX ORDER — LIDOCAINE HYDROCHLORIDE 10 MG/ML
INJECTION, SOLUTION EPIDURAL; INFILTRATION; INTRACAUDAL; PERINEURAL
Status: DISCONTINUED
Start: 2022-02-23 | End: 2022-02-23 | Stop reason: HOSPADM

## 2022-02-23 RX ORDER — ALPRAZOLAM 0.5 MG/1
1 TABLET, ORALLY DISINTEGRATING ORAL ONCE AS NEEDED
Status: COMPLETED | OUTPATIENT
Start: 2022-02-23 | End: 2022-02-23

## 2022-02-23 RX ORDER — LIDOCAINE HYDROCHLORIDE 10 MG/ML
1 INJECTION, SOLUTION EPIDURAL; INFILTRATION; INTRACAUDAL; PERINEURAL ONCE
Status: COMPLETED | OUTPATIENT
Start: 2022-02-23 | End: 2022-02-23

## 2022-02-23 RX ORDER — DEXAMETHASONE SODIUM PHOSPHATE 10 MG/ML
10 INJECTION INTRAMUSCULAR; INTRAVENOUS ONCE
Status: COMPLETED | OUTPATIENT
Start: 2022-02-23 | End: 2022-02-23

## 2022-02-23 RX ORDER — LIDOCAINE HYDROCHLORIDE 20 MG/ML
INJECTION, SOLUTION INFILTRATION; PERINEURAL
Status: DISCONTINUED
Start: 2022-02-23 | End: 2022-02-23 | Stop reason: HOSPADM

## 2022-02-23 RX ORDER — LIDOCAINE HYDROCHLORIDE 20 MG/ML
10 INJECTION, SOLUTION INFILTRATION; PERINEURAL ONCE
Status: COMPLETED | OUTPATIENT
Start: 2022-02-23 | End: 2022-02-23

## 2022-02-23 RX ADMIN — ALPRAZOLAM 1 MG: 0.5 TABLET, ORALLY DISINTEGRATING ORAL at 12:02

## 2022-02-23 NOTE — DISCHARGE SUMMARY
[facility] - Endoscopy  Discharge Note  Short Stay    Procedure(s) (LRB):  Left L5 + S1 Transforaminal Epidural Steroid Injection (Left)    OUTCOME: Patient tolerated treatment/procedure well without complication and is now ready for discharge.    DISPOSITION: Home or Self Care    FINAL DIAGNOSIS:  <principal problem not specified>    FOLLOWUP: In clinic    DISCHARGE INSTRUCTIONS:  No discharge procedures on file.       Discharge Diagnosis:DDD (degenerative disc disease), lumbar [M51.36]  Lumbar radiculopathy [M54.16]  Condition on Discharge: Stable.  Diet on Discharge: Same as before.  Activity: as per instruction sheet.  Discharge to: Home with a responsible adult.  Follow up: as per Discharge instructions

## 2022-02-23 NOTE — OP NOTE
Lumbar transforaminal ANEL    Time-out taken to identify patient and procedure side prior to starting the procedure.   I attest that I have reviewed the patient's home medications prior to the procedure and no contraindication have been identified. I  re-evaluated the patient after the patient was positioned for the procedure in the procedure room immediately before the procedural time-out. The vital signs are current and represent the current state of the patient which has not significantly changed since the preprocedure assessment.                              Date of Service: 02/23/2022    PCP: Wild Santamaria MD    Referring Physician:                                     PROCEDURE: Left L5 and S1 transforaminal epidural steroid injection under fluoroscopy    REASON FOR PROCEDURE: DDD (degenerative disc disease), lumbar [M51.36]  Lumbar radiculopathy [M54.16]    PHYSICIAN: Ferny Vences MD  ASSISTANTS:None    MEDICATIONS INJECTED:  Preservative-free dexamethasone 10mg, Xylocaine 1% MPF 3-5ml. 3ml per level. Preservative free, sterile normal saline is used to get larger volume as needed.  LOCAL ANESTHETIC INJECTED:  Xylocaine 2% 3ml per site.    SEDATION MEDICATIONS: None  ESTIMATED BLOOD LOSS:  None.    COMPLICATIONS:  None.    TECHNIQUE:   Laying in a prone position, the patient was prepped and draped in the usual sterile fashion using ChloraPrep and fenestrated drape.  The area to be injected was determined under fluoroscopic guidance.  Local anesthetic was given by raising a wheel and going down to the hub of a 27-gauge 1.25 inch needle.  The 4.75 inch 25-gauge spinal needle was introduced towards the transverse process of each above named nerve root level.  The needle was walked medially then hinged into the neural foramen.  Omnipaque was injected to confirm appropriate placement and that there was no vascular runoff.  The medication was then injected after applying negative pressure. The patient tolerated  the procedure well.    PAIN BEFORE THE PROCEDURE: 1/10.    PAIN AFTER THE PROCEDURE: 5/10.    The patient was monitored after the procedure.  Patient was given post procedure and discharge instructions to follow at home.  We will see the patient back in two weeks or the patient may call to inform of status. The patient was discharged in a stable condition.

## 2022-02-23 NOTE — DISCHARGE INSTRUCTIONS

## 2022-03-08 ENCOUNTER — CLINICAL SUPPORT (OUTPATIENT)
Dept: REHABILITATION | Facility: HOSPITAL | Age: 37
End: 2022-03-08
Attending: PAIN MEDICINE
Payer: COMMERCIAL

## 2022-03-08 DIAGNOSIS — R29.898 WEAKNESS OF LEFT LOWER EXTREMITY: ICD-10-CM

## 2022-03-08 DIAGNOSIS — R26.2 DIFFICULTY WALKING: ICD-10-CM

## 2022-03-08 DIAGNOSIS — M54.16 LUMBAR RADICULOPATHY: Primary | ICD-10-CM

## 2022-03-08 PROCEDURE — 97110 THERAPEUTIC EXERCISES: CPT | Mod: PN

## 2022-04-05 NOTE — PROGRESS NOTES
"[facility]   Physical Therapy Treatment Note     Name: Cale Steiner  Clinic Number: [id number]    Therapy Diagnosis:   No diagnosis found.  Physician: Ferny Vences Jr*    Visit Date: 3/8/2022    Physician Orders: PT Eval and Treat   Medical Diagnosis from Referral: DDD (degenerative disc disease), lumbar [M51.36], Lumbar spondylosis [M47.816], Lumbar radiculopathy [M54.16]  Evaluation Date: 11/22/2021  Authorization Period Expiration: 11/03/2022  Plan of Care Expiration: 02/18/2022  Progress Note Due: 12/22/2021  Visit # / Visits authorized: 14/pending   FOTO: 1/ 3   Precautions: Standard     PTA Visit #: 0/5     Time In: 2:45PM  Time Out: 3:45PM  Total Billable Time: 30 minutes    SUBJECTIVE     Pt reports: "I feel really good now"              He was compliant with home exercise program.  Response to previous treatment: soreness  Functional change: Full return to activity     Pain: 2/10  Location: left back      OBJECTIVE     N/A    Treatment     Cale received the treatments listed below:      therapeutic exercises to develop strength, ROM, flexibility, posture and core stabilization for 30 minutes including:    Bike x5min   DL Squat x15  SL Squat x15 ea  Lumbar ROM assessment: full  Hoping: Full  Jogging: no pain     manual therapy techniques: were applied for 15 minutes, including:    Thoracic Mobilization Gd 5  Hip Extension Mobilization Gd 3/4               Patient Education and Home Exercises     Home Exercises Provided and Patient Education Provided     Education provided:   - HEP, form, frequency, anatomy    Written Home Exercises Provided: Patient instructed to cont prior HEP. Exercises were reviewed and Cale was able to demonstrate them prior to the end of the session.  Cale demonstrated good  understanding of the education provided. See EMR under Patient Instructions for exercises provided during therapy sessions    ASSESSMENT     Patient passed all return to activity " tests on this date. He is currently experiencing no pain and is back to his full activity workload. He is to contact the clinic with future issues.       Cale is progressing well towards his goals.   Pt prognosis is Fair.     Pt will continue to benefit from skilled outpatient physical therapy to address the deficits listed in the problem list box on initial evaluation, provide pt/family education and to maximize pt's level of independence in the home and community environment.     Pt's spiritual, cultural and educational needs considered and pt agreeable to plan of care and goals.     Anticipated barriers to physical therapy: Work Requirements    Goals:   Short Term Goals: 4 weeks   1. The patient with report compliance with HEP to maximize functional outcomes.  2. The patient will demonstrate increase in BLE MMT by 1/2 grade to improve pt's functional mobility  3. The patient will decrease pain level by 50% in order to improve QOL.     Long Term Goals: 8 weeks   1. The patient will demonstrate understanding and performance of advanced HEP to allow for independence once discharged from therapy.   2. 2. The patient will demonstrate increase in BLE MMT by 1 grade to improve pt's functional mobility  3. The patient will report 44 % functional limitation on FOTO to indicate an improvement in overall function.  4. The patient will be able to julienne/doff shoes & socks w/o pain in order to improve pt's ability to perform ADLs.  5. Pt will be able to lift 25 lbs off the ground w/o pain in order to improve pt's ability to perform household chores.  6. Pt will squat 15x w/o pain to return to PLOF.    PLAN     Plan of care Certification: 11/22/2021 to 02/18/2022.     Outpatient Physical Therapy 2 times weekly for 6 weeks to include the following interventions: Cervical/Lumbar Traction, Electrical Stimulation Dry Needling, Gait Training, Manual Therapy, Moist Heat/ Ice, Neuromuscular Re-ed, Patient Education, Self Care,  Therapeutic Activites, Therapeutic Exercise and Ultrasound.     Jamaal Diallo, PT

## 2022-04-06 ENCOUNTER — OFFICE VISIT (OUTPATIENT)
Dept: PAIN MEDICINE | Facility: CLINIC | Age: 37
End: 2022-04-06
Payer: COMMERCIAL

## 2022-04-06 VITALS
SYSTOLIC BLOOD PRESSURE: 136 MMHG | DIASTOLIC BLOOD PRESSURE: 69 MMHG | BODY MASS INDEX: 24.14 KG/M2 | OXYGEN SATURATION: 97 % | TEMPERATURE: 98 F | WEIGHT: 173.13 LBS | HEART RATE: 63 BPM

## 2022-04-06 DIAGNOSIS — M54.16 LUMBAR RADICULOPATHY: ICD-10-CM

## 2022-04-06 DIAGNOSIS — M51.36 DDD (DEGENERATIVE DISC DISEASE), LUMBAR: Primary | Chronic | ICD-10-CM

## 2022-04-06 PROCEDURE — 3008F BODY MASS INDEX DOCD: CPT | Mod: CPTII,S$GLB,, | Performed by: PAIN MEDICINE

## 2022-04-06 PROCEDURE — 3075F SYST BP GE 130 - 139MM HG: CPT | Mod: CPTII,S$GLB,, | Performed by: PAIN MEDICINE

## 2022-04-06 PROCEDURE — 3044F PR MOST RECENT HEMOGLOBIN A1C LEVEL <7.0%: ICD-10-PCS | Mod: CPTII,S$GLB,, | Performed by: PAIN MEDICINE

## 2022-04-06 PROCEDURE — 99214 PR OFFICE/OUTPT VISIT, EST, LEVL IV, 30-39 MIN: ICD-10-PCS | Mod: S$GLB,,, | Performed by: PAIN MEDICINE

## 2022-04-06 PROCEDURE — 1159F MED LIST DOCD IN RCRD: CPT | Mod: CPTII,S$GLB,, | Performed by: PAIN MEDICINE

## 2022-04-06 PROCEDURE — 1160F RVW MEDS BY RX/DR IN RCRD: CPT | Mod: CPTII,S$GLB,, | Performed by: PAIN MEDICINE

## 2022-04-06 PROCEDURE — 3078F PR MOST RECENT DIASTOLIC BLOOD PRESSURE < 80 MM HG: ICD-10-PCS | Mod: CPTII,S$GLB,, | Performed by: PAIN MEDICINE

## 2022-04-06 PROCEDURE — 3008F PR BODY MASS INDEX (BMI) DOCUMENTED: ICD-10-PCS | Mod: CPTII,S$GLB,, | Performed by: PAIN MEDICINE

## 2022-04-06 PROCEDURE — 1159F PR MEDICATION LIST DOCUMENTED IN MEDICAL RECORD: ICD-10-PCS | Mod: CPTII,S$GLB,, | Performed by: PAIN MEDICINE

## 2022-04-06 PROCEDURE — 99999 PR PBB SHADOW E&M-EST. PATIENT-LVL III: CPT | Mod: PBBFAC,,, | Performed by: PAIN MEDICINE

## 2022-04-06 PROCEDURE — 3078F DIAST BP <80 MM HG: CPT | Mod: CPTII,S$GLB,, | Performed by: PAIN MEDICINE

## 2022-04-06 PROCEDURE — 3075F PR MOST RECENT SYSTOLIC BLOOD PRESS GE 130-139MM HG: ICD-10-PCS | Mod: CPTII,S$GLB,, | Performed by: PAIN MEDICINE

## 2022-04-06 PROCEDURE — 99214 OFFICE O/P EST MOD 30 MIN: CPT | Mod: S$GLB,,, | Performed by: PAIN MEDICINE

## 2022-04-06 PROCEDURE — 1160F PR REVIEW ALL MEDS BY PRESCRIBER/CLIN PHARMACIST DOCUMENTED: ICD-10-PCS | Mod: CPTII,S$GLB,, | Performed by: PAIN MEDICINE

## 2022-04-06 PROCEDURE — 3044F HG A1C LEVEL LT 7.0%: CPT | Mod: CPTII,S$GLB,, | Performed by: PAIN MEDICINE

## 2022-04-06 PROCEDURE — 99999 PR PBB SHADOW E&M-EST. PATIENT-LVL III: ICD-10-PCS | Mod: PBBFAC,,, | Performed by: PAIN MEDICINE

## 2022-04-06 NOTE — PROGRESS NOTES
Subjective:     Patient ID: Cale Steiner is a 36 y.o. male    Chief Complaint: Follow-up and Low-back Pain      Referred by: No ref. provider found    Disclaimer: This note was generated using voice recognition software.  There may be typographical errors that were missed during proofreading.    HPI:    Interval History (4/6/22):  He returns today for follow up.  He reports that left L5 and S1 transforaminal epidural steroid injection has been helpful for the left-sided low back and lower extremity pain.  He reports that he is % better since last encounter.  He is very happy with these results and does not feel that further treatment is needed at this time.      Interval History NP (02/07/22):    Pt returns today for follow up. He was seen by Neurosurgery and was deemed not a surgical candidate at this time. He would like to proceed with interventional procedures. His pain remains unchanged in quality and location since last encounter.     Interval History NP (1/11/22):    Pt returns today for follow up and imaging review. He reports that his pain continues to increase in the left lower extremity, mainly anterior thigh. Pain is sever at times and does affect his daily life, sleep, etc. He is at his worst a 9/10 on there pain scale. He has continued a HEP for years. He denies any new numbness, weakness, or tingling. He also denies bowel or bladder dysfunction. He does not wish to discuss interventional procedures at this time, but would prefer a referral to neurosurgery.     Interval History NP (01/06/22):    Pt returns today for follow up. He reports that PT has been helpful but he continues to have left lower extremity pain. The pain is now less intense and no longer constant, but still bothersome. He continues gabapentin, but is not consistent with dosing. He denies any adverse effects. He also takes ibuprofen and tylenol prn for pain with good relief. He denies any new or worsening symptoms.     Initial  Encounter (11/3/21):  Cale Steiner is a 36 y.o. male who presents today with left-sided buttock and lower extremity pain.  Patient has had this pain intermittently for years.  Episodes usually self-limited and mild.  He states that his current episode started a few weeks ago.  Is more severe than previous episodes and has not improved..  The pain is located in the left lumbosacral/gluteal region.  The pain will radiate to left posterior thigh.  He denies any associated numbness, tingling, weakness, bowel bladder dysfunction.  Pain is constant and worsened with activity.  Pain is also worse at night when lying down..   This pain is described in detail below.    Physical Therapy:  No, but does maintain a daily HEP.     Non-pharmacologic Treatment:  Rest helps         · TENS?  No    Pain Medications:         · Currently taking:  Meloxicam    · Has tried in the past:  NSAIDs, Tylenol    · Has not tried:  Opioids, Muscle relaxants, TCAs, SNRIs, anticonvulsants, topical creams    Blood thinners:  None    Interventional Therapies:    2/23/22 - left L5 and S1 transforaminal epidural steroid injection - 100% relief    Relevant Surgeries:  None    Affecting sleep?  Yes    Affecting daily activities? yes    Depressive symptoms? no          · SI/HI? No    Work status: Employed    Pain Scores:    Best:       0/10  Worst:     0/10  Usually:  0/10  Today:   0/10    Review of Systems   Constitutional: Negative for activity change, appetite change, chills, fatigue, fever and unexpected weight change.   HENT: Negative for hearing loss.    Eyes: Negative for visual disturbance.   Respiratory: Negative for chest tightness and shortness of breath.    Cardiovascular: Negative for chest pain.   Gastrointestinal: Negative for abdominal pain, constipation, diarrhea, nausea and vomiting.   Genitourinary: Negative for difficulty urinating.   Musculoskeletal: Positive for back pain, gait problem and myalgias. Negative for neck pain.    Skin: Negative for rash.   Neurological: Negative for dizziness, weakness, light-headedness, numbness and headaches.   Psychiatric/Behavioral: Positive for sleep disturbance. Negative for hallucinations and suicidal ideas. The patient is not nervous/anxious.        Past Medical History:   Diagnosis Date    Lumbar spondylosis 11/3/2021       Past Surgical History:   Procedure Laterality Date    EPIDURAL STEROID INJECTION Left 2/23/2022    Procedure: Left L5 + S1 Transforaminal Epidural Steroid Injection;  Surgeon: eFrny Vences Jr., MD;  Location: [facility];  Service: Pain Management;  Laterality: Left;  @ 1230  No ATC/DM    thumb surgery         Social History     Socioeconomic History    Marital status:    Tobacco Use    Smoking status: Never Smoker    Smokeless tobacco: Never Used   Substance and Sexual Activity    Alcohol use: No    Drug use: No    Sexual activity: Yes     Partners: Female       Review of patient's allergies indicates:  No Known Allergies    Current Outpatient Medications on File Prior to Visit   Medication Sig Dispense Refill    gabapentin (NEURONTIN) 300 MG capsule Take 2 capsules (600 mg total) by mouth 3 (three) times daily. 180 capsule 5    [DISCONTINUED] naproxen (NAPROSYN) 500 MG tablet Take 1 tablet (500 mg total) by mouth 2 (two) times daily as needed. (Patient not taking: Reported on 4/6/2022) 60 tablet 1     No current facility-administered medications on file prior to visit.       Objective:      /69 (BP Location: Right arm, Patient Position: Sitting, BP Method: Medium (Automatic))   Pulse 63   Temp 97.9 °F (36.6 °C) (Oral)   Wt 78.5 kg (173 lb 1.6 oz)   SpO2 97%   BMI 24.14 kg/m²     Exam:  GEN:  Well developed, well nourished.  No acute distress.  Normal pain behavior.  HEENT:  No trauma.  Mucous membranes moist.  Nares patent bilaterally.  PSYCH: Normal affect. Thought content appropriate.  CHEST:  Breathing symmetric.  No audible  wheezing.  ABD: Soft, non-distended.  SKIN:  Warm, pink, dry.  No rash on exposed areas.    EXT:  No cyanosis, clubbing, or edema.  No color change or changes in nail or hair growth.  NEURO/MUSCULOSKELETAL:  Fully alert, oriented, and appropriate. Speech normal renato. No cranial nerve deficits.   Gait:  Normal.  No trendelenburg sign bilaterally.           Imaging:    Narrative & Impression  EXAMINATION:  MRI LUMBAR SPINE WITHOUT CONTRAST     CLINICAL HISTORY:  Lumbar radiculopathy, no red flags, no prior management; Radiculopathy, lumbar region     TECHNIQUE:  Multiplanar, multisequence MR images were acquired from the thoracolumbar junction to the sacrum without the administration of contrast.     COMPARISON:  11/03/2021     FINDINGS:  Straightening of the normal lumbar lordosis.  Otherwise, sagittal alignment is maintained.  Conus terminates at L1.  Visualized cord signal is within normal limits.  There is disc desiccation and mild height loss at L4-5 and L5-S1.  The remaining disc heights are maintained.  Vertebral body heights are maintained.  No evidence of fracture or marrow replacement process.  Visualized retroperitoneal structures show no significant abnormalities.     T12-L1: No significant central canal stenosis or neural foraminal narrowing.     L1-2: No significant central canal stenosis or neural foraminal narrowing.     L2-3, L3-4: Mild broad-based disc bulge with mild facet arthropathy.  No significant central canal stenosis or neural foraminal narrowing.     L4-5: There is a diffuse disc bulge with a superimposed small right paracentral protrusion and annular fissure.  Mild facet arthropathy.  Mild lateral recess stenosis but overall no significant central canal stenosis or neural foraminal narrowing.     L5-S1: Diffuse disc bulge with an annular fissure with superimposed large left paracentral protrusion measuring 2.1 cm wide by 0.9 cm in AP diameter.  This causes left lateral recess stenosis  and overall moderate central canal stenosis.  This likely impinges on the descending left S1 nerve root.  Given the size, this may impinge on the other descending nerve roots as well.  There is mild facet arthropathy with small facet effusions.  Mild neural foraminal narrowing.     Impression:     Degenerative disc disease at the lower 2 lumbar levels.  Findings most significant at L5-S1 where there is a large left paracentral protrusion likely impinging on the descending left S1 nerve root.  Specific details at each level above.     This report was flagged in Epic as abnormal.        Electronically signed by: Anastacio Fine MD  Date:                                            01/07/2022  Time:                                           08:22    Narrative & Impression  EXAMINATION:  XR LUMBAR SPINE AP AND LAT WITH FLEX/EXT     CLINICAL HISTORY:  Other intervertebral disc degeneration, lumbar region     TECHNIQUE:  AP and lateral views as well as lateral flexion and extension images are performed through the lumbar spine.     COMPARISON:  None     FINDINGS:  Slight straightening of the expected normal lumbar lordosis with trace grade 1 retrolisthesis of L2 on L3 and L4 on L5 with neutral and extension positioning.  There is partial reduction of retrolisthesis at L2/L3 with flexion positioning..  Lumbar vertebral body heights and contours are within normal limits without evidence for acute fracture.  Slight convex left curvature lumbar spine.  Further evaluation as warranted clinically.     Impression:     Please see above        Electronically signed by: Ulysses Becker DO  Date:                                            11/03/2021  Time:                                           15:58    Assessment:       Encounter Diagnoses   Name Primary?    DDD (degenerative disc disease), lumbar Yes    Lumbar radiculopathy          Plan:       Cale was seen today for follow-up and low-back pain.    Diagnoses and all orders for  this visit:    DDD (degenerative disc disease), lumbar    Lumbar radiculopathy        Cale Steiner is a 36 y.o. male with acute exacerbation of chronic intermittent left-sided low back and lower extremity pain.  Likely from lumbar degenerative disc disease and possibly nerve root impingement.  Not having overt signs of radiculopathy but is having radiating pain to lower extremity concerning for nerve root irritation.     1.  No further treatment needed at this time.  2.  We discussed that he can gradually wean off of gabapentin.  3.  Return to clinic as needed.  Can repeat epidural steroid injection the future if needed.

## 2022-05-02 ENCOUNTER — PATIENT MESSAGE (OUTPATIENT)
Dept: PAIN MEDICINE | Facility: CLINIC | Age: 37
End: 2022-05-02
Payer: COMMERCIAL

## 2022-05-02 DIAGNOSIS — M54.16 LUMBAR RADICULOPATHY: Primary | ICD-10-CM

## 2022-05-02 DIAGNOSIS — M51.36 DDD (DEGENERATIVE DISC DISEASE), LUMBAR: ICD-10-CM

## 2022-05-10 ENCOUNTER — TELEPHONE (OUTPATIENT)
Dept: PAIN MEDICINE | Facility: CLINIC | Age: 37
End: 2022-05-10
Payer: COMMERCIAL

## 2022-05-10 NOTE — TELEPHONE ENCOUNTER
Reviewed pre-procedure instructions with Mr. Madsen, as well as provided arrival time of 1215p for 5/18/2022.  All questions answered- verbalized understanding.

## 2022-05-18 ENCOUNTER — HOSPITAL ENCOUNTER (OUTPATIENT)
Facility: HOSPITAL | Age: 37
Discharge: HOME | End: 2022-05-18
Attending: PAIN MEDICINE | Admitting: PAIN MEDICINE
Payer: COMMERCIAL

## 2022-05-18 VITALS
RESPIRATION RATE: 16 BRPM | OXYGEN SATURATION: 100 % | HEART RATE: 66 BPM | TEMPERATURE: 98 F | SYSTOLIC BLOOD PRESSURE: 136 MMHG | DIASTOLIC BLOOD PRESSURE: 61 MMHG

## 2022-05-18 DIAGNOSIS — M54.16 LUMBAR RADICULOPATHY: ICD-10-CM

## 2022-05-18 DIAGNOSIS — M51.36 DDD (DEGENERATIVE DISC DISEASE), LUMBAR: Primary | Chronic | ICD-10-CM

## 2022-05-18 PROCEDURE — 64484 NJX AA&/STRD TFRM EPI L/S EA: CPT | Performed by: PAIN MEDICINE

## 2022-05-18 PROCEDURE — 63600175 PHARM REV CODE 636 W HCPCS: Performed by: PAIN MEDICINE

## 2022-05-18 PROCEDURE — 25500020 PHARM REV CODE 255: Performed by: PAIN MEDICINE

## 2022-05-18 PROCEDURE — 25000003 PHARM REV CODE 250: Performed by: PAIN MEDICINE

## 2022-05-18 PROCEDURE — 64483 NJX AA&/STRD TFRM EPI L/S 1: CPT | Performed by: PAIN MEDICINE

## 2022-05-18 PROCEDURE — 64483 NJX AA&/STRD TFRM EPI L/S 1: CPT | Mod: LT,,, | Performed by: PAIN MEDICINE

## 2022-05-18 PROCEDURE — 64483 PR EPIDURAL INJ, ANES/STEROID, TRANSFORAMINAL, LUMB/SACR, SNGL LEVL: ICD-10-PCS | Mod: LT,,, | Performed by: PAIN MEDICINE

## 2022-05-18 RX ORDER — LIDOCAINE HYDROCHLORIDE 10 MG/ML
INJECTION, SOLUTION EPIDURAL; INFILTRATION; INTRACAUDAL; PERINEURAL
Status: DISCONTINUED
Start: 2022-05-18 | End: 2022-05-18 | Stop reason: HOSPADM

## 2022-05-18 RX ORDER — DEXAMETHASONE SODIUM PHOSPHATE 10 MG/ML
10 INJECTION INTRAMUSCULAR; INTRAVENOUS ONCE
Status: COMPLETED | OUTPATIENT
Start: 2022-05-18 | End: 2022-05-18

## 2022-05-18 RX ORDER — ALPRAZOLAM 0.5 MG/1
1 TABLET, ORALLY DISINTEGRATING ORAL ONCE AS NEEDED
Status: COMPLETED | OUTPATIENT
Start: 2022-05-18 | End: 2022-05-18

## 2022-05-18 RX ORDER — DEXAMETHASONE SODIUM PHOSPHATE 10 MG/ML
INJECTION INTRAMUSCULAR; INTRAVENOUS
Status: DISCONTINUED
Start: 2022-05-18 | End: 2022-05-18 | Stop reason: HOSPADM

## 2022-05-18 RX ORDER — LIDOCAINE HYDROCHLORIDE 20 MG/ML
INJECTION, SOLUTION INFILTRATION; PERINEURAL
Status: DISCONTINUED
Start: 2022-05-18 | End: 2022-05-18 | Stop reason: HOSPADM

## 2022-05-18 RX ORDER — LIDOCAINE HYDROCHLORIDE 20 MG/ML
10 INJECTION, SOLUTION INFILTRATION; PERINEURAL ONCE
Status: COMPLETED | OUTPATIENT
Start: 2022-05-18 | End: 2022-05-18

## 2022-05-18 RX ORDER — LIDOCAINE HYDROCHLORIDE 10 MG/ML
1 INJECTION, SOLUTION EPIDURAL; INFILTRATION; INTRACAUDAL; PERINEURAL ONCE
Status: COMPLETED | OUTPATIENT
Start: 2022-05-18 | End: 2022-05-18

## 2022-05-18 RX ADMIN — ALPRAZOLAM 1 MG: 0.5 TABLET, ORALLY DISINTEGRATING ORAL at 01:05

## 2022-05-18 NOTE — DISCHARGE SUMMARY
[facility] - Endoscopy  Discharge Note  Short Stay    Procedure(s) (LRB):  Left L5 + S1 Transforaminal Epidural Steroid Injections (Left)    OUTCOME: Patient tolerated treatment/procedure well without complication and is now ready for discharge.    DISPOSITION: Home or Self Care    FINAL DIAGNOSIS:  <principal problem not specified>    FOLLOWUP: In clinic    DISCHARGE INSTRUCTIONS:  No discharge procedures on file.      Clinical Reference Documents Added to Patient Instructions       Document    EPIDURAL INJECTION (ENGLISH)            Discharge Diagnosis:Lumbar radiculopathy [M54.16]  DDD (degenerative disc disease), lumbar [M51.36]  Condition on Discharge: Stable.  Diet on Discharge: Same as before.  Activity: as per instruction sheet.  Discharge to: Home with a responsible adult.  Follow up: as per Discharge instructions

## 2022-05-18 NOTE — OP NOTE
Lumbar transforaminal ANEL    Time-out taken to identify patient and procedure side prior to starting the procedure.   I attest that I have reviewed the patient's home medications prior to the procedure and no contraindication have been identified. I  re-evaluated the patient after the patient was positioned for the procedure in the procedure room immediately before the procedural time-out. The vital signs are current and represent the current state of the patient which has not significantly changed since the preprocedure assessment.                              Date of Service: 05/18/2022    PCP: Wild Santamaria MD    Referring Physician:                                     PROCEDURE: Left L5 and S1 transforaminal epidural steroid injection under fluoroscopy    REASON FOR PROCEDURE: Left Lumbar radiculopathy [M54.16]  DDD (degenerative disc disease), lumbar [M51.36]    PHYSICIAN: Ferny Vences MD  ASSISTANTS:None    MEDICATIONS INJECTED:  Preservative-free dexamethasone 10mg, Xylocaine 1% MPF 3-5ml. 3ml per level. Preservative free, sterile normal saline is used to get larger volume as needed.  LOCAL ANESTHETIC INJECTED:  Xylocaine 2% 3ml per site.    SEDATION MEDICATIONS: None  ESTIMATED BLOOD LOSS:  None.    COMPLICATIONS:  None.    TECHNIQUE:   Laying in a prone position, the patient was prepped and draped in the usual sterile fashion using ChloraPrep and fenestrated drape.  The area to be injected was determined under fluoroscopic guidance.  Local anesthetic was given by raising a wheel and going down to the hub of a 27-gauge 1.25 inch needle.  The 4.75 inch 25-gauge spinal needle was introduced towards the transverse process of each above named nerve root level.  The needle was walked medially then hinged into the neural foramen.  Omnipaque was injected to confirm appropriate placement and that there was no vascular runoff.  The medication was then injected after applying negative pressure. The patient  tolerated the procedure well.    PAIN BEFORE THE PROCEDURE: 3/10.    PAIN AFTER THE PROCEDURE: 8/10.    The patient was monitored after the procedure.  Patient was given post procedure and discharge instructions to follow at home.  We will see the patient back in two weeks or the patient may call to inform of status. The patient was discharged in a stable condition.

## 2022-05-18 NOTE — PLAN OF CARE
MD Vences notified that pt c/o pain 7/10.  MD at bedside to assess. No further orders at this time.  Pt denies any numbness or tingling.

## 2022-05-18 NOTE — PLAN OF CARE
Pt verbalized readiness to go home.  Pt given verbal and written DC instructions regarding medications and follow up care.  Pt verbalized understanding and has no questions at this time.    Pt able to ambulate in room without any difficulty

## 2022-05-18 NOTE — H&P
Subjective:     Patient ID: Cale Steiner is a 36 y.o. male    Chief Complaint: Low back pain    Referred by: Ferny Vences Jr*      HPI:    Cale Steiner is a 36 y.o. male who presents today for left L5 and S1. He denies any changes in the quality or location of the pain.        Review of Systems   Constitutional: Negative for activity change, appetite change, chills, fatigue, fever and unexpected weight change.   HENT: Negative for hearing loss.    Eyes: Negative for visual disturbance.   Respiratory: Negative for chest tightness and shortness of breath.    Cardiovascular: Negative for chest pain.   Gastrointestinal: Negative for abdominal pain, constipation, diarrhea, nausea and vomiting.   Genitourinary: Negative for difficulty urinating.   Musculoskeletal: Positive for back pain, gait problem and myalgias. Negative for neck pain.   Skin: Negative for rash.   Neurological: Negative for dizziness, weakness, light-headedness, numbness and headaches.   Psychiatric/Behavioral: Positive for sleep disturbance. Negative for hallucinations and suicidal ideas. The patient is not nervous/anxious.        Past Medical History:   Diagnosis Date    Lumbar spondylosis 11/3/2021       Past Surgical History:   Procedure Laterality Date    EPIDURAL STEROID INJECTION Left 2/23/2022    Procedure: Left L5 + S1 Transforaminal Epidural Steroid Injection;  Surgeon: Ferny Vences Jr., MD;  Location: [facility];  Service: Pain Management;  Laterality: Left;  @ 1230  No ATC/DM    thumb surgery         Social History     Socioeconomic History    Marital status:    Tobacco Use    Smoking status: Never Smoker    Smokeless tobacco: Never Used   Substance and Sexual Activity    Alcohol use: No    Drug use: No    Sexual activity: Yes     Partners: Female       Review of patient's allergies indicates:  No Known Allergies    No current facility-administered medications on file prior to encounter.     Current  Outpatient Medications on File Prior to Encounter   Medication Sig Dispense Refill    gabapentin (NEURONTIN) 300 MG capsule Take 2 capsules (600 mg total) by mouth 3 (three) times daily. 180 capsule 5       Objective:      /74   Pulse (!) 48   Temp 98.1 °F (36.7 °C)   Resp 16   SpO2 98%     Exam:  AAOx3 NAD  Mood and affect normal  Memory and language intact  Breathing nonlabored      Assessment:       Lumbar DDD      Plan:         Cale Steiner is a 36 y.o. male with lumbar DDD.    Proceed with ANEL as planned

## 2022-05-18 NOTE — DISCHARGE INSTRUCTIONS

## 2022-05-19 ENCOUNTER — TELEPHONE (OUTPATIENT)
Dept: PAIN MEDICINE | Facility: CLINIC | Age: 37
End: 2022-05-19
Payer: COMMERCIAL

## 2022-05-31 ENCOUNTER — OFFICE VISIT (OUTPATIENT)
Dept: PAIN MEDICINE | Facility: CLINIC | Age: 37
End: 2022-05-31
Payer: COMMERCIAL

## 2022-05-31 VITALS
WEIGHT: 174.13 LBS | DIASTOLIC BLOOD PRESSURE: 75 MMHG | HEIGHT: 71 IN | SYSTOLIC BLOOD PRESSURE: 119 MMHG | BODY MASS INDEX: 24.38 KG/M2 | TEMPERATURE: 99 F | OXYGEN SATURATION: 96 % | HEART RATE: 71 BPM

## 2022-05-31 DIAGNOSIS — M54.16 LUMBAR RADICULOPATHY: Primary | ICD-10-CM

## 2022-05-31 DIAGNOSIS — M51.36 DDD (DEGENERATIVE DISC DISEASE), LUMBAR: ICD-10-CM

## 2022-05-31 DIAGNOSIS — M51.26 PROTRUSION OF LUMBAR INTERVERTEBRAL DISC: ICD-10-CM

## 2022-05-31 DIAGNOSIS — M47.816 LUMBAR SPONDYLOSIS: ICD-10-CM

## 2022-05-31 PROCEDURE — 3078F DIAST BP <80 MM HG: CPT | Mod: CPTII,S$GLB,, | Performed by: NURSE PRACTITIONER

## 2022-05-31 PROCEDURE — 3044F HG A1C LEVEL LT 7.0%: CPT | Mod: CPTII,S$GLB,, | Performed by: NURSE PRACTITIONER

## 2022-05-31 PROCEDURE — 3074F SYST BP LT 130 MM HG: CPT | Mod: CPTII,S$GLB,, | Performed by: NURSE PRACTITIONER

## 2022-05-31 PROCEDURE — 1159F PR MEDICATION LIST DOCUMENTED IN MEDICAL RECORD: ICD-10-PCS | Mod: CPTII,S$GLB,, | Performed by: NURSE PRACTITIONER

## 2022-05-31 PROCEDURE — 1160F RVW MEDS BY RX/DR IN RCRD: CPT | Mod: CPTII,S$GLB,, | Performed by: NURSE PRACTITIONER

## 2022-05-31 PROCEDURE — 99214 PR OFFICE/OUTPT VISIT, EST, LEVL IV, 30-39 MIN: ICD-10-PCS | Mod: S$GLB,,, | Performed by: NURSE PRACTITIONER

## 2022-05-31 PROCEDURE — 1159F MED LIST DOCD IN RCRD: CPT | Mod: CPTII,S$GLB,, | Performed by: NURSE PRACTITIONER

## 2022-05-31 PROCEDURE — 99999 PR PBB SHADOW E&M-EST. PATIENT-LVL IV: CPT | Mod: PBBFAC,,, | Performed by: NURSE PRACTITIONER

## 2022-05-31 PROCEDURE — 99214 OFFICE O/P EST MOD 30 MIN: CPT | Mod: S$GLB,,, | Performed by: NURSE PRACTITIONER

## 2022-05-31 PROCEDURE — 99999 PR PBB SHADOW E&M-EST. PATIENT-LVL IV: ICD-10-PCS | Mod: PBBFAC,,, | Performed by: NURSE PRACTITIONER

## 2022-05-31 PROCEDURE — 3008F PR BODY MASS INDEX (BMI) DOCUMENTED: ICD-10-PCS | Mod: CPTII,S$GLB,, | Performed by: NURSE PRACTITIONER

## 2022-05-31 PROCEDURE — 3078F PR MOST RECENT DIASTOLIC BLOOD PRESSURE < 80 MM HG: ICD-10-PCS | Mod: CPTII,S$GLB,, | Performed by: NURSE PRACTITIONER

## 2022-05-31 PROCEDURE — 3044F PR MOST RECENT HEMOGLOBIN A1C LEVEL <7.0%: ICD-10-PCS | Mod: CPTII,S$GLB,, | Performed by: NURSE PRACTITIONER

## 2022-05-31 PROCEDURE — 1160F PR REVIEW ALL MEDS BY PRESCRIBER/CLIN PHARMACIST DOCUMENTED: ICD-10-PCS | Mod: CPTII,S$GLB,, | Performed by: NURSE PRACTITIONER

## 2022-05-31 PROCEDURE — 3074F PR MOST RECENT SYSTOLIC BLOOD PRESSURE < 130 MM HG: ICD-10-PCS | Mod: CPTII,S$GLB,, | Performed by: NURSE PRACTITIONER

## 2022-05-31 PROCEDURE — 3008F BODY MASS INDEX DOCD: CPT | Mod: CPTII,S$GLB,, | Performed by: NURSE PRACTITIONER

## 2022-05-31 RX ORDER — GABAPENTIN 800 MG/1
800 TABLET ORAL 3 TIMES DAILY
Qty: 90 TABLET | Refills: 11 | Status: SHIPPED | OUTPATIENT
Start: 2022-05-31 | End: 2023-03-24

## 2022-05-31 RX ORDER — NAPROXEN 500 MG/1
500 TABLET ORAL 2 TIMES DAILY
COMMUNITY
Start: 2022-05-19 | End: 2022-06-17

## 2022-05-31 NOTE — PROGRESS NOTES
Subjective:     Patient ID: Cale Steiner is a 36 y.o. male    Chief Complaint: Low-back Pain and Leg Pain (Left leg)      Referred by: No ref. provider found    Disclaimer: This note was generated using voice recognition software.  There may be typographical errors that were missed during proofreading.    HPI:    Interval History NP (5/31/2022):  Mr Steiner presents for follow up of lower back pain to left side with left leg posterior radicular pain. He is s/p 2nd L5&S1 TFESI. While 1st provided significant benefit the repeat provided no benefit, not even short term. He is not interested in surgical intervention at this time and open to restarting PT as this was beneficial prior and continues HEP at this time. There is no focal voicing of loss of b/b or saddle paresthesias.     Interval History (4/6/22):  He returns today for follow up.  He reports that left L5 and S1 transforaminal epidural steroid injection has been helpful for the left-sided low back and lower extremity pain.  He reports that he is % better since last encounter.  He is very happy with these results and does not feel that further treatment is needed at this time.      Interval History NP (02/07/22):    Pt returns today for follow up. He was seen by Neurosurgery and was deemed not a surgical candidate at this time. He would like to proceed with interventional procedures. His pain remains unchanged in quality and location since last encounter.     Interval History NP (1/11/22):    Pt returns today for follow up and imaging review. He reports that his pain continues to increase in the left lower extremity, mainly anterior thigh. Pain is sever at times and does affect his daily life, sleep, etc. He is at his worst a 9/10 on there pain scale. He has continued a HEP for years. He denies any new numbness, weakness, or tingling. He also denies bowel or bladder dysfunction. He does not wish to discuss interventional procedures at this time, but  would prefer a referral to neurosurgery.     Interval History NP (01/06/22):    Pt returns today for follow up. He reports that PT has been helpful but he continues to have left lower extremity pain. The pain is now less intense and no longer constant, but still bothersome. He continues gabapentin, but is not consistent with dosing. He denies any adverse effects. He also takes ibuprofen and tylenol prn for pain with good relief. He denies any new or worsening symptoms.     Initial Encounter (11/3/21):  Cale Steiner is a 36 y.o. male who presents today with left-sided buttock and lower extremity pain.  Patient has had this pain intermittently for years.  Episodes usually self-limited and mild.  He states that his current episode started a few weeks ago.  Is more severe than previous episodes and has not improved..  The pain is located in the left lumbosacral/gluteal region.  The pain will radiate to left posterior thigh.  He denies any associated numbness, tingling, weakness, bowel bladder dysfunction.  Pain is constant and worsened with activity.  Pain is also worse at night when lying down..   This pain is described in detail below.    Physical Therapy:  No, but does maintain a daily HEP.     Non-pharmacologic Treatment:  Rest helps         · TENS?  No    Pain Medications:         · Currently taking:  Meloxicam    · Has tried in the past:  NSAIDs, Tylenol    · Has not tried:  Opioids, Muscle relaxants, TCAs, SNRIs, anticonvulsants, topical creams    Blood thinners:  None    Interventional Therapies:    2/23/22 - left L5 and S1 transforaminal epidural steroid injection - 100% relief  5/18/2022 Left L5&S1 TFESI no relief       Relevant Surgeries:  None    Affecting sleep?  Yes    Affecting daily activities? yes    Depressive symptoms? no          · SI/HI? No    Work status: Employed    Pain Scores:    Best:       2/10  Worst:     9/10  Usually: 5/10  Today:  4/10    Review of Systems   Constitutional: Negative  for activity change, appetite change, chills, fatigue, fever and unexpected weight change.   HENT: Negative for hearing loss.    Eyes: Negative for visual disturbance.   Respiratory: Negative for chest tightness and shortness of breath.    Cardiovascular: Negative for chest pain.   Gastrointestinal: Negative for abdominal pain, constipation, diarrhea, nausea and vomiting.   Genitourinary: Negative for difficulty urinating.   Musculoskeletal: Positive for back pain, gait problem and myalgias. Negative for neck pain.   Skin: Negative for rash.   Neurological: Negative for dizziness, weakness, light-headedness, numbness and headaches.   Psychiatric/Behavioral: Positive for sleep disturbance. Negative for hallucinations and suicidal ideas. The patient is not nervous/anxious.        Past Medical History:   Diagnosis Date    Lumbar spondylosis 11/3/2021       Past Surgical History:   Procedure Laterality Date    EPIDURAL STEROID INJECTION Left 2/23/2022    Procedure: Left L5 + S1 Transforaminal Epidural Steroid Injection;  Surgeon: Ferny Vences Jr., MD;  Location[facility];  Service: Pain Management;  Laterality: Left;  @ 1230  No ATC/DM    EPIDURAL STEROID INJECTION Left 5/18/2022    Procedure: Left L5 + S1 Transforaminal Epidural Steroid Injections;  Surgeon: Ferny Vences Jr., MD;  Location: [facility];  Service: Pain Management;  Laterality: Left;  @1215 (requested)  No ATC or DM  Needs Consent    thumb surgery         Social History     Socioeconomic History    Marital status:    Tobacco Use    Smoking status: Never Smoker    Smokeless tobacco: Never Used   Substance and Sexual Activity    Alcohol use: No    Drug use: No    Sexual activity: Yes     Partners: Female       Review of patient's allergies indicates:  No Known Allergies    Current Outpatient Medications on File Prior to Visit   Medication Sig Dispense Refill    naproxen (NAPROSYN) 500 MG tablet Take 500 mg by mouth 2 (two)  "times daily.      [DISCONTINUED] gabapentin (NEURONTIN) 300 MG capsule Take 2 capsules (600 mg total) by mouth 3 (three) times daily. 180 capsule 5     No current facility-administered medications on file prior to visit.       Objective:      /75   Pulse 71   Temp 98.7 °F (37.1 °C)   Ht 5' 11" (1.803 m)   Wt 79 kg (174 lb 1.6 oz)   SpO2 96%   BMI 24.28 kg/m²     Exam:  GEN:  Well developed, well nourished.  No acute distress.  Normal pain behavior.  HEENT:  No trauma.  Mucous membranes moist.  Nares patent bilaterally.  PSYCH: Normal affect. Thought content appropriate.  CHEST:  Breathing symmetric.  No audible wheezing.  ABD: Soft, non-distended.  SKIN:  Warm, pink, dry.  No rash on exposed areas.    EXT:  No cyanosis, clubbing, or edema.  No color change or changes in nail or hair growth.  NEURO/MUSCULOSKELETAL:  Fully alert, oriented, and appropriate. Speech normal renato. No cranial nerve deficits.   Gait:  Normal.  No trendelenburg sign bilaterally.   Musculoskeletal: 5/5 motor strength to BLE.        Imaging:    Narrative & Impression  EXAMINATION:  MRI LUMBAR SPINE WITHOUT CONTRAST     CLINICAL HISTORY:  Lumbar radiculopathy, no red flags, no prior management; Radiculopathy, lumbar region     TECHNIQUE:  Multiplanar, multisequence MR images were acquired from the thoracolumbar junction to the sacrum without the administration of contrast.     COMPARISON:  11/03/2021     FINDINGS:  Straightening of the normal lumbar lordosis.  Otherwise, sagittal alignment is maintained.  Conus terminates at L1.  Visualized cord signal is within normal limits.  There is disc desiccation and mild height loss at L4-5 and L5-S1.  The remaining disc heights are maintained.  Vertebral body heights are maintained.  No evidence of fracture or marrow replacement process.  Visualized retroperitoneal structures show no significant abnormalities.     T12-L1: No significant central canal stenosis or neural foraminal " narrowing.     L1-2: No significant central canal stenosis or neural foraminal narrowing.     L2-3, L3-4: Mild broad-based disc bulge with mild facet arthropathy.  No significant central canal stenosis or neural foraminal narrowing.     L4-5: There is a diffuse disc bulge with a superimposed small right paracentral protrusion and annular fissure.  Mild facet arthropathy.  Mild lateral recess stenosis but overall no significant central canal stenosis or neural foraminal narrowing.     L5-S1: Diffuse disc bulge with an annular fissure with superimposed large left paracentral protrusion measuring 2.1 cm wide by 0.9 cm in AP diameter.  This causes left lateral recess stenosis and overall moderate central canal stenosis.  This likely impinges on the descending left S1 nerve root.  Given the size, this may impinge on the other descending nerve roots as well.  There is mild facet arthropathy with small facet effusions.  Mild neural foraminal narrowing.     Impression:     Degenerative disc disease at the lower 2 lumbar levels.  Findings most significant at L5-S1 where there is a large left paracentral protrusion likely impinging on the descending left S1 nerve root.  Specific details at each level above.     This report was flagged in Epic as abnormal.        Electronically signed by: Anastacio Fine MD  Date:                                            01/07/2022  Time:                                           08:22    Narrative & Impression  EXAMINATION:  XR LUMBAR SPINE AP AND LAT WITH FLEX/EXT     CLINICAL HISTORY:  Other intervertebral disc degeneration, lumbar region     TECHNIQUE:  AP and lateral views as well as lateral flexion and extension images are performed through the lumbar spine.     COMPARISON:  None     FINDINGS:  Slight straightening of the expected normal lumbar lordosis with trace grade 1 retrolisthesis of L2 on L3 and L4 on L5 with neutral and extension positioning.  There is partial reduction of  retrolisthesis at L2/L3 with flexion positioning..  Lumbar vertebral body heights and contours are within normal limits without evidence for acute fracture.  Slight convex left curvature lumbar spine.  Further evaluation as warranted clinically.     Impression:     Please see above        Electronically signed by: Ulysses Becker DO  Date:                                            11/03/2021  Time:                                           15:58    Assessment:       Encounter Diagnoses   Name Primary?    Lumbar radiculopathy Yes    Protrusion of lumbar intervertebral disc     DDD (degenerative disc disease), lumbar     Lumbar spondylosis          Plan:       Cale was seen today for low-back pain and leg pain.    Diagnoses and all orders for this visit:    Lumbar radiculopathy  -     Ambulatory referral/consult to [facility]; Future    Protrusion of lumbar intervertebral disc  -     Ambulatory referral/consult to [facility]; Future    DDD (degenerative disc disease), lumbar    Lumbar spondylosis    Other orders  -     gabapentin (NEURONTIN) 800 MG tablet; Take 1 tablet (800 mg total) by mouth 3 (three) times daily.        Cale Steiner is a 36 y.o. male with acute exacerbation of chronic intermittent left-sided low back and lower extremity pain.  Likely from lumbar degenerative disc disease and possibly nerve root impingement.  Not having overt signs of radiculopathy but is having radiating pain to lower extremity concerning for nerve root irritation.     - Prior records reviewed  - s/p repeat L5&S1 TFESI without relief and radicular pain consistent with his L5/S1 disc etiology   - Not interested in Surgical intervention at this time  - He will continue HEP and referred to [facility]  - Increase Neurontin from 600 to 800mg TID  - Discussed continuation of zanaflex for qhs  - RTC 2 months, sooner if needed. Consider re-referral to NS vs repeat TFESI at any time     MAXWELL Noriega    I  spent a total of 30 minutes on the day of the visit.  This includes face to face time and non-face to face time preparing to see the patient by reviewing previous labs/imaging, obtaining and/or reviewing separately obtained history, documenting clinical information in the electronic or other health record, independently interpreting results and communicating results to the patient/family/caregiver.

## 2022-08-01 ENCOUNTER — PATIENT MESSAGE (OUTPATIENT)
Dept: PAIN MEDICINE | Facility: CLINIC | Age: 37
End: 2022-08-01
Payer: COMMERCIAL

## 2022-08-02 ENCOUNTER — PATIENT MESSAGE (OUTPATIENT)
Dept: REHABILITATION | Facility: HOSPITAL | Age: 37
End: 2022-08-02
Payer: COMMERCIAL

## 2022-08-31 ENCOUNTER — CLINICAL SUPPORT (OUTPATIENT)
Dept: REHABILITATION | Facility: HOSPITAL | Age: 37
End: 2022-08-31
Payer: COMMERCIAL

## 2022-08-31 DIAGNOSIS — R29.898 DECREASED STRENGTH OF TRUNK AND BACK: ICD-10-CM

## 2022-08-31 DIAGNOSIS — M54.16 LUMBAR RADICULOPATHY: ICD-10-CM

## 2022-08-31 DIAGNOSIS — M51.26 PROTRUSION OF LUMBAR INTERVERTEBRAL DISC: ICD-10-CM

## 2022-08-31 DIAGNOSIS — M25.69 DECREASED RANGE OF MOTION OF TRUNK AND BACK: ICD-10-CM

## 2022-08-31 PROCEDURE — 97161 PT EVAL LOW COMPLEX 20 MIN: CPT | Mod: PN

## 2022-08-31 PROCEDURE — 97110 THERAPEUTIC EXERCISES: CPT | Mod: PN

## 2022-09-06 NOTE — PLAN OF CARE
[facility]     Name: Cale Steiner  Clinic Number: [id number]    Therapy Diagnosis:   Encounter Diagnoses   Name Primary?    Lumbar radiculopathy     Protrusion of lumbar intervertebral disc     Decreased strength of trunk and back     Decreased range of motion of trunk and back      Physician: Frankie Cooper, NP    Physician Orders: PT Eval and Treat   Medical Diagnosis from Referral:   M54.16 (ICD-10-CM) - Lumbar radiculopathy   M51.26 (ICD-10-CM) - Protrusion of lumbar intervertebral disc     Evaluation Date: 8/31/2022  Authorization Period Expiration: 8/26/2023  Plan of Care Expiration: 11/31/2022  Reassessment Due: 9/31/2022  Visit # / Visits authorized: 1/ 1 (pending)    Time In: 8:05 am  Time Out: 9:25 am  Total Billable Time: 80 minutes  Insurance:Fee for service Insurance Patient      Precautions: Standard    Pattern of pain determined: 1 PEP      Subjective   Date of onset: Oct 2021  History of current condition - Cale reports: he is not in any pain right now, but when he does get pain it is in the bilateral back and down the back of the L leg. He woke up in one day in October with some back pain that progressively got worse that day and then didn't get better over the next week or 2. He eventually got an MRI which revealed a bulging disc. He did therapy for 4-6 weeks and then had an ANEL and had some relief for 2-3 months. He got another shot in May, but didn't get any relief with that. He has started to feel better with continued exercise and has returned to the gym with minimal limitations. He is about to start a new job as a  in September and there will be some increased Aggravating factors include lying flat on his back, sleeping, bending, lifting, prolonged standing, prolonged sitting, walking, and long drives.     Medical History:   Past Medical History:   Diagnosis Date    Lumbar spondylosis 11/3/2021       Surgical History:   Cale  Kyle Steiner  has a past surgical history that includes thumb surgery; Epidural steroid injection (Left, 2/23/2022); and Epidural steroid injection (Left, 5/18/2022).    Medications:   Cale has a current medication list which includes the following prescription(s): gabapentin and naproxen.    Allergies:   Review of patient's allergies indicates:  No Known Allergies     Imaging: MRI LUMBAR SPINE WITHOUT CONTRAST     CLINICAL HISTORY:  Lumbar radiculopathy, no red flags, no prior management; Radiculopathy, lumbar region     TECHNIQUE:  Multiplanar, multisequence MR images were acquired from the thoracolumbar junction to the sacrum without the administration of contrast.     COMPARISON:  11/03/2021     FINDINGS:  Straightening of the normal lumbar lordosis.  Otherwise, sagittal alignment is maintained.  Conus terminates at L1.  Visualized cord signal is within normal limits.  There is disc desiccation and mild height loss at L4-5 and L5-S1.  The remaining disc heights are maintained.  Vertebral body heights are maintained.  No evidence of fracture or marrow replacement process.  Visualized retroperitoneal structures show no significant abnormalities.     T12-L1: No significant central canal stenosis or neural foraminal narrowing.     L1-2: No significant central canal stenosis or neural foraminal narrowing.     L2-3, L3-4: Mild broad-based disc bulge with mild facet arthropathy.  No significant central canal stenosis or neural foraminal narrowing.     L4-5: There is a diffuse disc bulge with a superimposed small right paracentral protrusion and annular fissure.  Mild facet arthropathy.  Mild lateral recess stenosis but overall no significant central canal stenosis or neural foraminal narrowing.     L5-S1: Diffuse disc bulge with an annular fissure with superimposed large left paracentral protrusion measuring 2.1 cm wide by 0.9 cm in AP diameter.  This causes left lateral recess stenosis and overall moderate central  "canal stenosis.  This likely impinges on the descending left S1 nerve root.  Given the size, this may impinge on the other descending nerve roots as well.  There is mild facet arthropathy with small facet effusions.  Mild neural foraminal narrowing.     Impression:     Degenerative disc disease at the lower 2 lumbar levels.  Findings most significant at L5-S1 where there is a large left paracentral protrusion likely impinging on the descending left S1 nerve root.  Specific details at each level above.    Prior Therapy: yes, end of 2021/early 2022  Prior Treatment: 2 total injections  Social History: 2 story home,  lives with family  Occupation:    Leisure: Exercise, gun range, going to [employer]    Prior Level of Function: independent  Current Level of Function: increased pain and decreased tolerance to lying flat on his back, sleeping, bending, lifting, prolonged standing, prolonged sitting, walking, and long drives  DME owned/used: none        Pain:  Current 0/10, worst 8/10, best 0/10   Location: bilateral back, L posterior thigh, sometimes to ankle   Description: Aching, Dull, and sore  Aggravating Factors: lying flat on his back, sleeping, bending, lifting, prolonged standing, prolonged sitting, walking, and long drives  Easing Factors: ice, injection, and rest  Disturbed Sleep: when its bad yes, right now it's fine      Pattern of pain questions:  1.  Where is your pain the worst? L post thigh  2.  Is your pain constant or intermittent? Intermittent   3.  Does bending forward make your typical pain worse? Not right now, but when it is bad yes  4.  Since the start of your back pain, has there been a change in your bowel or bladder? no  5.  What can't you do now that you use to be able to do? Very he      Pts goals: "Be able to do everything I need to at my new job as a  without being limited by my back"      Red Flag Screening:   Cough  Sneeze  Strain: (--)  Bladder/ bowel: " (--)  Falls: (--)  Night pain: (--)  Unexplained weight loss: (--)  General health: Good    OBJECTIVE     Postural examination/scapula alignment: Rounded shoulder, Head forward, Slouched posture, Increased kyphosis, and Decreased lordosis  Joint integrity: hypomobility of lumbar spine noted with CPA's  Skin integrity: intact  Edema: none  Sitting: poor  Standing: poor  Correction of posture: better with lumbar roll    MOVEMENT LOSS    ROM Loss   Flexion minimal loss   Extension minimal loss   Side glide Right minimal loss   Side glide Left minimal loss   Rotation Right minimal loss   Rotation Left minimal loss     Lower Extremity Strength  Right LE 5/5 Left LE 5/5       GAIT:  Assistive Device used: none  Level of Assistance: independent  Patient displays the following gait deviations:  no gait deviations observed.     Special Tests:   Test Name  Test Result   Prone Instability Test (+)   SI Joint Provocation Test (--)   Straight Leg Raise (--)   Neural Tension Test (--)   Crossed Straight Leg Raise (--)   Walking on toes (--)   Walking on heels  (--)       NEUROLOGICAL SCREENING     Sensory deficit: intact to light touch     Reflexes:    Left Right   Patella Tendon 2+ 2+   Achilles Tendon 1+ 1+   Babinski  (--) (--)   Clonus (--) (--)     REPEATED TEST MOVEMENTS:    Baseline symptoms:  Repeated Flexion in Standing no effect   Repeated Extension in Standing no effect   Repeated Flexion in lying no effect   Repeated Extension in lying  better       STATIC TESTS and other movements:   Baseline symptoms:  Prone lie no effect   Prone lie on elbows no effect   Sustained prone with  using mat no effect   Sustained flexion no effect   Sitting slouched  worse   Sitting erect better   Standing slouched worse   Standing erect  better   Lying prone in extension  better   Long sitting   worse       Baseline Isometric Testing on Med X equipment: Testing administered by PT  Date of testin2022  ROM 0-51 deg   Max Peak  "Torque 154    Min Peak Torque 73    Flex/Ext Ratio 2.2:1   % below normative data 55     Starting weight 73#      Limitation/Restriction for FOTO lumbar Survey    Therapist reviewed FOTO scores for Cale Steiner on 8/31/2022.   FOTO documents entered into [employer] - see Media section.    Limitation Score: 41%  Goal: 6%             Treatment   Treatment Time In: 8:45  Treatment Time Out: 9:25  Total Treatment time separate from Evaluation: 40 minutes      Cale received therapeutic exercises to develop/improve posture, lumbar/cervical ROM, strength and muscular endurance for 30 minutes including the following exercises:     Medx Lumbar isometric strength testing    Open books x10  PPT 15x5"  Bridging 2x10  Hamstring stretch 2x30"  EIL x10  Cat Cow x10  SOC 10x5"    [facility] - Short 8/31/2022   Visit Number 1   VAS Pain Rating 0   Lumbar Stretches - Slouch 10   Extension in Lying 10   Extension in Standing 10   Lumbar Extension - Seat Pad 0   Femur Restraint 6   Top Dead Center 24   Counterweight 132   Lumbar Flexion 51   Lumbar Extension 0   Lumbar Peak Torque 154   Lumbar Weight 60   Repetitions 5         Written Home Exercises Provided: yes.  Exercises were reviewed and Cale was able to demonstrate them prior to the end of the session.  Cale demonstrated good  understanding of the education provided.     See EMR under Patient Instructions for exercises provided 8/31/2022.      Education provided:   - Patient received education regarding proper posture and body mechanics.  Patient was given top [facility] Visit 1 handouts which discuss what to expect in therapy, the purpose and opportunity for health coaching, the program,  wellness when discharged from therapy, back education and care specifically for posture seated, standing, lifting correctly, components of exercise, importance of nutrition and hydration, and importance of sleep.   Information on lumbar rolls provided.  - Kacey roll tried, " recommended, and purchase information was provided.    - Patient received a handout regarding anticipated muscular soreness following the isometric test and strategies for management were reviewed with patient including stretching, using ice and scheduled rest.   - Patient received education on the [facility] program, purpose of the isometric test, progression of back strengthening as well as wellness approach and systemic strengthening.  Details of the program were discussed.  Reviewed that patient should feel support/pressure from med ex restraints but no pain or discomfort and patient expressed understanding.  Med x dynamic exercise and baseline IM test performed with instructions to guide the patient safely through the isometric testing.  Patient informed to perform isometric test correctly, and safely, building best force they safely can and not pushing through pain.  Patient demonstrated understanding of information      Cale received cold pack for 10 minutes to low back in z-lie.    Assessment   Cale is a 36 y.o. male referred to [facility] with a medical diagnosis of M54.16 (ICD-10-CM) - Lumbar radiculopathy, and M51.26 (ICD-10-CM) - Protrusion of lumbar intervertebral disc. Pt presents with signs and symptoms consistent with diagnosis including decreased range of motion of lumbar spine, weakness of trunk and back, poor posture, decreased joint mobility, decreased soft tissue flexibility and mobility, and decreased functional mobility toleranc. These deficits are limiting patient in full participation in ADL's and leisure activities such as lying flat on his back, sleeping, bending, lifting, prolonged standing, prolonged sitting, walking, and long drives. Pt is 55% below normative values with medx lumbar isometric strength testing.       Pain Pattern: 1 PEP       Pt prognosis is Excellent.   Pt will benefit from skilled outpatient Physical Therapy to address the deficits stated above and in the  chart below, provide pt/family education, and to maximize pt's level of independence. Based on the above history and physical examination an active physical therapy program is recommended.  Pt will continue to benefit from skilled outpatient physical therapy to address the deficits listed below in the chart, provide pt/family education and to maximize pt's level of independence in the home and community environment. .       Plan of care discussed with patient: Yes  Pt's spiritual, cultural and educational needs considered and patient is agreeable to the plan of care and goals as stated below:     Anticipated Barriers for therapy: none    PT Evaluation Completed? Yes    Medical necessity is demonstrated by the following problem list.    Pt presents with the following impairments:     History  Co-morbidities and personal factors that may impact the plan of care Co-morbidities:   difficulty sleeping    Personal Factors:   no deficits     low   Examination  Body Structures and Functions, activity limitations and participation restrictions that may impact the plan of care Body Regions:   back  lower extremities  trunk    Body Systems:    motor control    Participation Restrictions:   See above    Activity limitations:   Learning and applying knowledge  no deficits    General Tasks and Commands  no deficits    Communication  no deficits    Mobility  lifting and carrying objects  walking  driving (bike, car, motorcycle)    Self care  no deficits    Domestic Life  no deficits    Interactions/Relationships  no deficits    Life Areas  no deficits    Community and Social Life  no deficits         low   Clinical Presentation stable and uncomplicated low   Decision Making/ Complexity Score: low       GOALS: Pt is in agreement with the following goals.    Short term goals:  6 weeks or 10 visits   1.  Pt will demonstrate increased lumbar ROM by at least 3 degrees from the initial ROM value with improvements noted in functional ROM  and ability to perform ADLs.  (approp and ongoing)  2.  Pt will demonstrate increased MedX average isometric strength value  by 15% from initial test resulting in improved ability to perform bending, lifting, and carrying activities safely, confidently.  (approp and ongoing)  3.  Patient report a reduction in worst pain score by 1-2 points for improved tolerance for walking.  (approp and ongoing)  4.  Pt able to perform HEP correctly with minimal cueing or supervision from therapist to encourage independent management of symptoms. (approp and ongoing)      Long term goals: 10 weeks or 20 visits   1. Pt will demonstrate increased lumbar ROM by at least 6 degrees from initial ROM value, resulting in improved ability to perform functional fwd bending while standing and sitting. (approp and ongoing)  2. Pt will demonstrate increased MedX average isometric strength value  by 30% from initial test resulting in improved ability to perform bending, lifting, and carrying activities safely, confidently.  (approp and ongoing)  3. Pt to demonstrate ability to independently control and reduce their pain through posture positioning and mechanical movements throughout a typical day.  (approp and ongoing)  4.  Pt will demonstrate reduced pain and improved functional outcomes as reported on the FOTO by reaching a limitation score of < or = 6% or less in order to demonstrate subjective improvement in pt's condition.    (approp and ongoing)  5. Pt will demonstrate independence with the HEP at discharge  (approp and ongoing)  6. Pt will be able to complete all work related duties as a  without limitations due to pain.  (approp and ongoing)      Plan   Outpatient physical therapy 2x week for 10 weeks or 20 visits to include the following:   - Patient education  - Therapeutic exercise  - Manual therapy  - Performance testing   - Neuromuscular Re-education  - Therapeutic activity   - Modalities    Pt may be seen by PTA as  "part of the rehabilitation team.     Therapist: Warren Canas, PT  9/6/2022    "I certify the need for these services furnished under this plan of treatment and while under my care."    ____________________________________  Physician/Referring Practitioner    _______________  Date of Signature            "

## 2023-03-24 ENCOUNTER — OFFICE VISIT (OUTPATIENT)
Dept: FAMILY MEDICINE | Facility: CLINIC | Age: 38
End: 2023-03-24
Payer: COMMERCIAL

## 2023-03-24 VITALS
BODY MASS INDEX: 24.23 KG/M2 | HEART RATE: 55 BPM | DIASTOLIC BLOOD PRESSURE: 62 MMHG | TEMPERATURE: 98 F | WEIGHT: 173.06 LBS | HEIGHT: 71 IN | SYSTOLIC BLOOD PRESSURE: 108 MMHG | OXYGEN SATURATION: 95 %

## 2023-03-24 DIAGNOSIS — D72.819 LEUKOPENIA, UNSPECIFIED TYPE: ICD-10-CM

## 2023-03-24 DIAGNOSIS — Z00.00 ROUTINE PHYSICAL EXAMINATION: Primary | ICD-10-CM

## 2023-03-24 PROCEDURE — 3008F BODY MASS INDEX DOCD: CPT | Mod: CPTII,S$GLB,, | Performed by: FAMILY MEDICINE

## 2023-03-24 PROCEDURE — 3078F DIAST BP <80 MM HG: CPT | Mod: CPTII,S$GLB,, | Performed by: FAMILY MEDICINE

## 2023-03-24 PROCEDURE — 99999 PR PBB SHADOW E&M-EST. PATIENT-LVL III: ICD-10-PCS | Mod: PBBFAC,,, | Performed by: FAMILY MEDICINE

## 2023-03-24 PROCEDURE — 1159F PR MEDICATION LIST DOCUMENTED IN MEDICAL RECORD: ICD-10-PCS | Mod: CPTII,S$GLB,, | Performed by: FAMILY MEDICINE

## 2023-03-24 PROCEDURE — 3074F SYST BP LT 130 MM HG: CPT | Mod: CPTII,S$GLB,, | Performed by: FAMILY MEDICINE

## 2023-03-24 PROCEDURE — 99395 PR PREVENTIVE VISIT,EST,18-39: ICD-10-PCS | Mod: S$GLB,,, | Performed by: FAMILY MEDICINE

## 2023-03-24 PROCEDURE — 3008F PR BODY MASS INDEX (BMI) DOCUMENTED: ICD-10-PCS | Mod: CPTII,S$GLB,, | Performed by: FAMILY MEDICINE

## 2023-03-24 PROCEDURE — 99395 PREV VISIT EST AGE 18-39: CPT | Mod: S$GLB,,, | Performed by: FAMILY MEDICINE

## 2023-03-24 PROCEDURE — 99999 PR PBB SHADOW E&M-EST. PATIENT-LVL III: CPT | Mod: PBBFAC,,, | Performed by: FAMILY MEDICINE

## 2023-03-24 PROCEDURE — 1159F MED LIST DOCD IN RCRD: CPT | Mod: CPTII,S$GLB,, | Performed by: FAMILY MEDICINE

## 2023-03-24 PROCEDURE — 3074F PR MOST RECENT SYSTOLIC BLOOD PRESSURE < 130 MM HG: ICD-10-PCS | Mod: CPTII,S$GLB,, | Performed by: FAMILY MEDICINE

## 2023-03-24 PROCEDURE — 3078F PR MOST RECENT DIASTOLIC BLOOD PRESSURE < 80 MM HG: ICD-10-PCS | Mod: CPTII,S$GLB,, | Performed by: FAMILY MEDICINE

## 2023-03-24 NOTE — PROGRESS NOTES
[facility]  Progress Note    SUBJECTIVE:     Chief Complaint   Patient presents with    Annual Exam       HPI   Cale Steiner  is a 37 y.o. male here for routine physical exam. Patient has no other new complaints/problems at this time.      Review of patient's allergies indicates:  No Known Allergies    Past Medical History:   Diagnosis Date    Lumbar spondylosis 11/3/2021     Past Surgical History:   Procedure Laterality Date    EPIDURAL STEROID INJECTION Left 2/23/2022    Procedure: Left L5 + S1 Transforaminal Epidural Steroid Injection;  Surgeon: Ferny Vences Jr., MD;  Location: [facility] ENDO;  Service: Pain Management;  Laterality: Left;  @ 1230  No ATC/DM    EPIDURAL STEROID INJECTION Left 5/18/2022    Procedure: Left L5 + S1 Transforaminal Epidural Steroid Injections;  Surgeon: Ferny Vences Jr., MD;  Location: [facility] ENDO;  Service: Pain Management;  Laterality: Left;  @1215 (requested)  No ATC or DM  Needs Consent    thumb surgery       Family History   Problem Relation Age of Onset    Multiple myeloma Mother     No Known Problems Father     No Known Problems Brother     No Known Problems Sister     No Known Problems Maternal Aunt     No Known Problems Maternal Uncle     No Known Problems Paternal Aunt     No Known Problems Paternal Uncle     No Known Problems Maternal Grandmother     No Known Problems Maternal Grandfather     No Known Problems Paternal Grandmother     No Known Problems Paternal Grandfather     Amblyopia Neg Hx     Blindness Neg Hx     Cancer Neg Hx     Cataracts Neg Hx     Diabetes Neg Hx     Glaucoma Neg Hx     Hypertension Neg Hx     Macular degeneration Neg Hx     Retinal detachment Neg Hx     Strabismus Neg Hx     Stroke Neg Hx     Thyroid disease Neg Hx      Social History     Tobacco Use    Smoking status: Never    Smokeless tobacco: Never   Substance Use Topics    Alcohol use: No    Drug use: No        Review of Systems   Constitutional:  Negative for chills,  diaphoresis and fever.   HENT:  Negative for congestion, ear pain, hearing loss and sore throat.    Eyes:  Negative for photophobia and discharge.   Respiratory:  Negative for cough, shortness of breath and wheezing.    Cardiovascular:  Negative for chest pain and palpitations.   Gastrointestinal:  Negative for abdominal pain, constipation, diarrhea, nausea and vomiting.   Genitourinary:  Negative for dysuria and hematuria.   Musculoskeletal:  Negative for back pain and myalgias.   Skin:  Negative for itching and rash.   Neurological:  Negative for dizziness, sensory change, focal weakness, weakness and headaches.   All other systems reviewed and are negative.  OBJECTIVE:     Vitals:    03/24/23 0813   BP: 108/62   Pulse: (!) 55   Temp: 98.4 °F (36.9 °C)     Body mass index is 24.14 kg/m².    Physical Exam  Constitutional:       General: He is not in acute distress.     Appearance: He is not diaphoretic.   HENT:      Head: Normocephalic and atraumatic.      Right Ear: Tympanic membrane and ear canal normal. No hemotympanum. Tympanic membrane is not perforated, erythematous or bulging.      Left Ear: Tympanic membrane and ear canal normal. No hemotympanum. Tympanic membrane is not perforated, erythematous or bulging.   Eyes:      Conjunctiva/sclera: Conjunctivae normal.      Pupils: Pupils are equal, round, and reactive to light.   Neck:      Thyroid: No thyromegaly.   Cardiovascular:      Rate and Rhythm: Normal rate and regular rhythm.      Heart sounds: Normal heart sounds. No murmur heard.    No friction rub. No gallop.   Pulmonary:      Effort: Pulmonary effort is normal. No respiratory distress.      Breath sounds: Normal breath sounds. No wheezing or rales.   Abdominal:      General: Bowel sounds are normal. There is no distension.      Palpations: Abdomen is soft.      Tenderness: There is no abdominal tenderness. There is no guarding or rebound.   Musculoskeletal:         General: No tenderness. Normal range  of motion.   Skin:     General: Skin is warm.      Findings: No erythema or rash.   Neurological:      Mental Status: He is alert and oriented to person, place, and time.       Old records were reviewed. Labs and/or images were independently reviewed.    ASSESSMENT     1. Routine physical examination    2. Leukopenia, unspecified type        PLAN:     Routine physical examination  -     CBC Auto Differential; Future  -     Comprehensive Metabolic Panel; Future  -     Hemoglobin A1C; Future  -     Lipid Panel; Future  -     TSH; Future  -     T4, Free; Future  -     VITAMIN B12; Future; Expected date: 03/24/2023  -     We briefly discussed diet, exercise, and routine preventive exams. All questions and comments addressed.    Leukopenia, unspecified type  -     Pathologist Interpretation Differential; Future; Expected date: 03/24/2023  -     mild persistent neutropenia. Ordered peripheral smear. ANC > 1000, asymptomatic, not on any medications. Likely DANC?      RTC PRSANDY Santamaria MD  03/24/2023 8:32 AM

## 2023-03-30 ENCOUNTER — LAB VISIT (OUTPATIENT)
Dept: LAB | Facility: HOSPITAL | Age: 38
End: 2023-03-30
Attending: FAMILY MEDICINE
Payer: COMMERCIAL

## 2023-03-30 DIAGNOSIS — D72.819 LEUKOPENIA, UNSPECIFIED TYPE: ICD-10-CM

## 2023-03-30 DIAGNOSIS — Z00.00 ROUTINE PHYSICAL EXAMINATION: ICD-10-CM

## 2023-03-30 LAB
ALBUMIN SERPL BCP-MCNC: 4.2 G/DL (ref 3.5–5.2)
ALP SERPL-CCNC: 80 U/L (ref 55–135)
ALT SERPL W/O P-5'-P-CCNC: 18 U/L (ref 10–44)
ANION GAP SERPL CALC-SCNC: 8 MMOL/L (ref 8–16)
AST SERPL-CCNC: 24 U/L (ref 10–40)
BASOPHILS # BLD AUTO: 0.01 K/UL (ref 0–0.2)
BASOPHILS NFR BLD: 0.2 % (ref 0–1.9)
BILIRUB SERPL-MCNC: 0.9 MG/DL (ref 0.1–1)
BUN SERPL-MCNC: 13 MG/DL (ref 6–20)
CALCIUM SERPL-MCNC: 9.4 MG/DL (ref 8.7–10.5)
CHLORIDE SERPL-SCNC: 106 MMOL/L (ref 95–110)
CHOLEST SERPL-MCNC: 175 MG/DL (ref 120–199)
CHOLEST/HDLC SERPL: 3.3 {RATIO} (ref 2–5)
CO2 SERPL-SCNC: 26 MMOL/L (ref 23–29)
CREAT SERPL-MCNC: 1.2 MG/DL (ref 0.5–1.4)
DIFFERENTIAL METHOD: NORMAL
EOSINOPHIL # BLD AUTO: 0.1 K/UL (ref 0–0.5)
EOSINOPHIL NFR BLD: 1.5 % (ref 0–8)
ERYTHROCYTE [DISTWIDTH] IN BLOOD BY AUTOMATED COUNT: 12.5 % (ref 11.5–14.5)
EST. GFR  (NO RACE VARIABLE): >60 ML/MIN/1.73 M^2
ESTIMATED AVG GLUCOSE: 114 MG/DL (ref 68–131)
GLUCOSE SERPL-MCNC: 88 MG/DL (ref 70–110)
HBA1C MFR BLD: 5.6 % (ref 4–5.6)
HCT VFR BLD AUTO: 44.7 % (ref 40–54)
HDLC SERPL-MCNC: 53 MG/DL (ref 40–75)
HDLC SERPL: 30.3 % (ref 20–50)
HGB BLD-MCNC: 14.7 G/DL (ref 14–18)
IMM GRANULOCYTES # BLD AUTO: 0.01 K/UL (ref 0–0.04)
IMM GRANULOCYTES NFR BLD AUTO: 0.2 % (ref 0–0.5)
LDLC SERPL CALC-MCNC: 108.2 MG/DL (ref 63–159)
LYMPHOCYTES # BLD AUTO: 1.2 K/UL (ref 1–4.8)
LYMPHOCYTES NFR BLD: 19.5 % (ref 18–48)
MCH RBC QN AUTO: 30.1 PG (ref 27–31)
MCHC RBC AUTO-ENTMCNC: 32.9 G/DL (ref 32–36)
MCV RBC AUTO: 91 FL (ref 82–98)
MONOCYTES # BLD AUTO: 0.6 K/UL (ref 0.3–1)
MONOCYTES NFR BLD: 10.3 % (ref 4–15)
NEUTROPHILS # BLD AUTO: 4.1 K/UL (ref 1.8–7.7)
NEUTROPHILS NFR BLD: 68.3 % (ref 38–73)
NONHDLC SERPL-MCNC: 122 MG/DL
NRBC BLD-RTO: 0 /100 WBC
PATH REV BLD -IMP: NORMAL
PLATELET # BLD AUTO: 252 K/UL (ref 150–450)
PMV BLD AUTO: 10.9 FL (ref 9.2–12.9)
POTASSIUM SERPL-SCNC: 4.1 MMOL/L (ref 3.5–5.1)
PROT SERPL-MCNC: 6.9 G/DL (ref 6–8.4)
RBC # BLD AUTO: 4.89 M/UL (ref 4.6–6.2)
SODIUM SERPL-SCNC: 140 MMOL/L (ref 136–145)
T4 FREE SERPL-MCNC: 0.83 NG/DL (ref 0.71–1.51)
TRIGL SERPL-MCNC: 69 MG/DL (ref 30–150)
TSH SERPL DL<=0.005 MIU/L-ACNC: 1.12 UIU/ML (ref 0.4–4)
VIT B12 SERPL-MCNC: 820 PG/ML (ref 210–950)
WBC # BLD AUTO: 5.95 K/UL (ref 3.9–12.7)

## 2023-03-30 PROCEDURE — 83036 HEMOGLOBIN GLYCOSYLATED A1C: CPT | Performed by: FAMILY MEDICINE

## 2023-03-30 PROCEDURE — 80053 COMPREHEN METABOLIC PANEL: CPT | Performed by: FAMILY MEDICINE

## 2023-03-30 PROCEDURE — 85025 COMPLETE CBC W/AUTO DIFF WBC: CPT | Performed by: FAMILY MEDICINE

## 2023-03-30 PROCEDURE — 85060 PATHOLOGIST REVIEW: ICD-10-PCS | Mod: ,,, | Performed by: PATHOLOGY

## 2023-03-30 PROCEDURE — 85060 BLOOD SMEAR INTERPRETATION: CPT | Mod: ,,, | Performed by: PATHOLOGY

## 2023-03-30 PROCEDURE — 36415 COLL VENOUS BLD VENIPUNCTURE: CPT | Mod: PO | Performed by: FAMILY MEDICINE

## 2023-03-30 PROCEDURE — 80061 LIPID PANEL: CPT | Performed by: FAMILY MEDICINE

## 2023-03-30 PROCEDURE — 84439 ASSAY OF FREE THYROXINE: CPT | Performed by: FAMILY MEDICINE

## 2023-03-30 PROCEDURE — 82525 ASSAY OF COPPER: CPT | Performed by: FAMILY MEDICINE

## 2023-03-30 PROCEDURE — 84443 ASSAY THYROID STIM HORMONE: CPT | Performed by: FAMILY MEDICINE

## 2023-03-30 PROCEDURE — 82607 VITAMIN B-12: CPT | Performed by: FAMILY MEDICINE

## 2023-03-31 LAB — PATH REV BLD -IMP: NORMAL

## 2023-04-03 LAB — COPPER SERPL-MCNC: 1013 UG/L (ref 665–1480)

## 2023-04-04 ENCOUNTER — PATIENT MESSAGE (OUTPATIENT)
Dept: RESEARCH | Facility: HOSPITAL | Age: 38
End: 2023-04-04
Payer: COMMERCIAL

## 2024-07-03 ENCOUNTER — OFFICE VISIT (OUTPATIENT)
Dept: FAMILY MEDICINE | Facility: CLINIC | Age: 39
End: 2024-07-03
Payer: COMMERCIAL

## 2024-07-03 VITALS
HEART RATE: 70 BPM | HEIGHT: 71 IN | TEMPERATURE: 98 F | BODY MASS INDEX: 24.41 KG/M2 | WEIGHT: 174.38 LBS | DIASTOLIC BLOOD PRESSURE: 62 MMHG | SYSTOLIC BLOOD PRESSURE: 119 MMHG | OXYGEN SATURATION: 98 %

## 2024-07-03 DIAGNOSIS — Z00.00 VISIT FOR ANNUAL HEALTH EXAMINATION: Primary | ICD-10-CM

## 2024-07-03 DIAGNOSIS — H61.23 BILATERAL IMPACTED CERUMEN: ICD-10-CM

## 2024-07-03 PROCEDURE — 99999 PR PBB SHADOW E&M-EST. PATIENT-LVL IV: CPT | Mod: PBBFAC,,,

## 2024-07-03 NOTE — PROGRESS NOTES
"  HPI     Chief Complaint:  Chief Complaint   Patient presents with    Annual Exam       Cale Steiner is a 38 y.o. male with multiple medical diagnoses as listed in the medical history and problem list that presents for   Chief Complaint   Patient presents with    Annual Exam   .   Patient is not known to me.      HPI    Annual    Social Factors  Tobacco use: No  Ready to Quit: No  Alcohol: No  Intimate partner violence screening  "Do you feel safe in your current relationship?" Yes   Living Will/POA: No  Regular Exercise: Yes strength training    Depression  Over the past two weeks, have you felt down, depressed, or hopeless? No  Over the past two weeks, have you felt little interest or pleasure in doing things? No    Reproductive Health  STD screening in last year: declines  HIV screening: negative    Screen for Chronic Disease  CHD Risk Factors: none  Estimated body mass index is 24.32 kg/m² as calculated from the following:    Height as of this encounter: 5' 11" (1.803 m).    Weight as of this encounter: 79.1 kg (174 lb 6.1 oz).  Dyslipidemia screening needed: order placed  T2DM screening needed: order placed  Colonoscopy needed: age  PSA needed: age  AAA screening needed:age/history  Screen men 35 years and older, and men 20 to 34 years of age who have cardiovascular risk factors for dyslipidemia  Begin screening colonoscopies at 50 years of age in men of average risk, and continue until 75 years of age; offer fecal occult blood testing every year, flexible sigmoidoscopy every five years combined with fecal occult blood testing every three years, or colonoscopy every 10 years   The American [employer] Association recommends offering PSA testing and digital rectal examination to well-informed men beginning at 40 years of age and continuing until life expectancy is less than 10 years  Screen once with ultrasonography in men 65 to 75 years of age if they have a family history or have smoked at [id number] " cigarettes in their lifetime  Screen men with a sustained blood pressure greater than 135/80 mm Hg for T2DM      Assessment & Plan       Problem List Items Addressed This Visit    None  Visit Diagnoses       Visit for annual health examination    -  Primary  Counseled on age appropriate medical preventative services including age appropriate cancer screenings, age appropriate eye and dental exams, over all nutritional health, need for a consistent exercise regimen, and an over all push towards maintaining a vigorous and active lifestyle.  Counseled on age appropriate vaccines and discussed upcoming health care needs based on age/gender. Discussed good sleep hygiene and stress management.    Referral to optometry    Relevant Orders    CBC Auto Differential    Comprehensive Metabolic Panel    Lipid Panel    Hemoglobin A1C    TSH    T4, Free    Ambulatory referral/consult to Optometry    Bilateral impacted cerumen      -advised against q-tips  -Use debrox BID as needed and remove wax in shower  -f/u with ENT as needed   -hearing improved after ear wax removed   -TM visualized, no s/s of infection or effusion noted    Ear Cerumen Removal    Date/Time: 7/3/2024 8:00 AM    Performed by: Peri Bowen NP  Authorized by: Peri Bowen NP      Local anesthetic:  None  Location details:  Both ears  Procedure type: curette and irrigation    Cerumen  Removal Results:  Cerumen completely removed  Patient tolerance:  Patient tolerated the procedure well with no immediate complications        Relevant Orders    Ear wax removal (Completed)            --------------------------------------------      Health Maintenance:  Health Maintenance         Date Due Completion Date    COVID-19 Vaccine (3 - 2023-24 season) 09/01/2023 8/19/2021    Influenza Vaccine (1) 09/01/2024 1/27/2022    Lipid Panel 03/30/2028 3/30/2023    TETANUS VACCINE 06/21/2028 6/21/2018            Health maintenance reviewed    Follow Up:  Follow up if symptoms worsen  or fail to improve.    Discussed DDx, condition, and treatment.   Education sent to patient portal/included in after visit summary.  ED precautions given.   Notify provider if symptoms do not resolve or increase in severity.   Patient verbalizes understanding and agrees with plan of care.    Exam     Review of Systems:  (as noted above)  Review of Systems   Constitutional:  Negative for activity change and unexpected weight change.   HENT:  Negative for hearing loss, rhinorrhea and trouble swallowing.    Eyes:  Negative for discharge and visual disturbance.   Respiratory:  Negative for chest tightness and wheezing.    Cardiovascular:  Negative for chest pain and palpitations.   Gastrointestinal:  Negative for blood in stool, constipation, diarrhea and vomiting.   Endocrine: Negative for polydipsia and polyuria.   Genitourinary:  Negative for difficulty urinating, hematuria and urgency.   Musculoskeletal:  Negative for arthralgias, joint swelling and neck pain.   Neurological:  Negative for weakness and headaches.   Psychiatric/Behavioral:  Negative for confusion and dysphoric mood.        Physical Exam:   Physical Exam  Constitutional:       General: He is not in acute distress.     Appearance: Normal appearance. He is normal weight. He is not toxic-appearing.   HENT:      Head: Normocephalic and atraumatic.      Right Ear: Tympanic membrane normal. There is impacted cerumen. Tympanic membrane is not injected or erythematous.      Left Ear: Tympanic membrane normal. There is impacted cerumen. Tympanic membrane is not injected or erythematous.      Mouth/Throat:      Mouth: Mucous membranes are moist.   Cardiovascular:      Rate and Rhythm: Normal rate and regular rhythm.      Pulses: Normal pulses.   Pulmonary:      Effort: Pulmonary effort is normal. No respiratory distress.      Breath sounds: Normal breath sounds. No wheezing.   Abdominal:      General: Bowel sounds are normal.      Palpations: Abdomen is soft.  "  Musculoskeletal:         General: Normal range of motion.      Cervical back: Normal range of motion and neck supple. No rigidity.   Lymphadenopathy:      Cervical: No cervical adenopathy.   Skin:     General: Skin is warm and dry.      Capillary Refill: Capillary refill takes less than 2 seconds.   Neurological:      General: No focal deficit present.      Mental Status: He is alert and oriented to person, place, and time.   Psychiatric:         Mood and Affect: Mood normal.       Vitals:    07/03/24 0809   BP: 119/62   Pulse: 70   Temp: 98.4 °F (36.9 °C)   TempSrc: Oral   SpO2: 98%   Weight: 79.1 kg (174 lb 6.1 oz)   Height: 5' 11" (1.803 m)      Body mass index is 24.32 kg/m².        History     Past Medical History:  Past Medical History:   Diagnosis Date    Lumbar spondylosis 11/3/2021       Past Surgical History:  Past Surgical History:   Procedure Laterality Date    EPIDURAL STEROID INJECTION Left 2/23/2022    Procedure: Left L5 + S1 Transforaminal Epidural Steroid Injection;  Surgeon: Ferny Vences Jr., MD;  Location: [facility] ENDO;  Service: Pain Management;  Laterality: Left;  @ 1230  No ATC/DM    EPIDURAL STEROID INJECTION Left 5/18/2022    Procedure: Left L5 + S1 Transforaminal Epidural Steroid Injections;  Surgeon: Ferny Vences Jr., MD;  Location: [facility] ENDO;  Service: Pain Management;  Laterality: Left;  @1215 (requested)  No ATC or DM  Needs Consent    thumb surgery         Social History:  Social History     Socioeconomic History    Marital status:    Tobacco Use    Smoking status: Never    Smokeless tobacco: Never   Substance and Sexual Activity    Alcohol use: No    Drug use: No    Sexual activity: Yes     Partners: Female     Social Determinants of Health     Financial Resource Strain: Low Risk  (7/2/2024)    Overall Financial Resource Strain (CARDIA)     Difficulty of Paying Living Expenses: Not hard at all   Food Insecurity: No Food Insecurity (7/2/2024)    Hunger Vital Sign     " Worried About Running Out of Food in the Last Year: Never true     Ran Out of Food in the Last Year: Never true   Physical Activity: Sufficiently Active (7/2/2024)    Exercise Vital Sign     Days of Exercise per Week: 4 days     Minutes of Exercise per Session: 70 min   Stress: No Stress Concern Present (7/2/2024)    Citizen of the [place] [employer] - Occupational Stress Questionnaire     Feeling of Stress : Not at all   Housing Stability: Unknown (7/2/2024)    Housing Stability Vital Sign     Unable to Pay for Housing in the Last Year: No       Family History:  Family History   Problem Relation Name Age of Onset    Multiple myeloma Mother      No Known Problems Father      No Known Problems Brother      No Known Problems Sister      No Known Problems Maternal Aunt      No Known Problems Maternal Uncle      No Known Problems Paternal Aunt      No Known Problems Paternal Uncle      No Known Problems Maternal Grandmother      No Known Problems Maternal Grandfather      No Known Problems Paternal Grandmother      No Known Problems Paternal Grandfather      Amblyopia Neg Hx      Blindness Neg Hx      Cancer Neg Hx      Cataracts Neg Hx      Diabetes Neg Hx      Glaucoma Neg Hx      Hypertension Neg Hx      Macular degeneration Neg Hx      Retinal detachment Neg Hx      Strabismus Neg Hx      Stroke Neg Hx      Thyroid disease Neg Hx         Allergies and Medications: (updated and reviewed)  Review of patient's allergies indicates:  No Known Allergies  No current outpatient medications on file.     No current facility-administered medications for this visit.       Patient Care Team:  Wild Santamaria MD as PCP - General (Family Medicine)  Yuliana Cardenas MA (Inactive) as Care Coordinator         - The patient is given an After Visit Summary that lists all medications with directions, allergies, education, orders placed during this encounter and follow-up instructions.      - I have reviewed the patient's medical  information including past medical, family, and social history sections including the medications and allergies.      - We discussed the patient's current medications.     This note was created by combination of typed  and MModal dictation.  Transcription errors may be present.  If there are any questions, please contact me.

## 2024-07-03 NOTE — PROCEDURES
Ear Cerumen Removal    Date/Time: 7/3/2024 8:00 AM    Performed by: Peri Bowen NP  Authorized by: Peri Bowen NP      Local anesthetic:  None  Location details:  Both ears  Procedure type: curette and irrigation    Cerumen  Removal Results:  Cerumen completely removed  Patient tolerance:  Patient tolerated the procedure well with no immediate complications

## 2024-07-09 ENCOUNTER — LAB VISIT (OUTPATIENT)
Dept: LAB | Facility: HOSPITAL | Age: 39
End: 2024-07-09
Payer: COMMERCIAL

## 2024-07-09 ENCOUNTER — PATIENT OUTREACH (OUTPATIENT)
Dept: ADMINISTRATIVE | Facility: HOSPITAL | Age: 39
End: 2024-07-09
Payer: COMMERCIAL

## 2024-07-09 DIAGNOSIS — Z00.00 VISIT FOR ANNUAL HEALTH EXAMINATION: ICD-10-CM

## 2024-07-09 LAB
ALBUMIN SERPL BCP-MCNC: 4 G/DL (ref 3.5–5.2)
ALP SERPL-CCNC: 75 U/L (ref 55–135)
ALT SERPL W/O P-5'-P-CCNC: 24 U/L (ref 10–44)
ANION GAP SERPL CALC-SCNC: 9 MMOL/L (ref 8–16)
AST SERPL-CCNC: 24 U/L (ref 10–40)
BASOPHILS # BLD AUTO: 0.02 K/UL (ref 0–0.2)
BASOPHILS NFR BLD: 0.7 % (ref 0–1.9)
BILIRUB SERPL-MCNC: 1.3 MG/DL (ref 0.1–1)
BUN SERPL-MCNC: 11 MG/DL (ref 6–20)
CALCIUM SERPL-MCNC: 9.7 MG/DL (ref 8.7–10.5)
CHLORIDE SERPL-SCNC: 107 MMOL/L (ref 95–110)
CHOLEST SERPL-MCNC: 202 MG/DL (ref 120–199)
CHOLEST/HDLC SERPL: 3.3 {RATIO} (ref 2–5)
CO2 SERPL-SCNC: 24 MMOL/L (ref 23–29)
CREAT SERPL-MCNC: 1.1 MG/DL (ref 0.5–1.4)
DIFFERENTIAL METHOD BLD: ABNORMAL
EOSINOPHIL # BLD AUTO: 0.2 K/UL (ref 0–0.5)
EOSINOPHIL NFR BLD: 6 % (ref 0–8)
ERYTHROCYTE [DISTWIDTH] IN BLOOD BY AUTOMATED COUNT: 12.5 % (ref 11.5–14.5)
EST. GFR  (NO RACE VARIABLE): >60 ML/MIN/1.73 M^2
ESTIMATED AVG GLUCOSE: 111 MG/DL (ref 68–131)
GLUCOSE SERPL-MCNC: 98 MG/DL (ref 70–110)
HBA1C MFR BLD: 5.5 % (ref 4–5.6)
HCT VFR BLD AUTO: 45.6 % (ref 40–54)
HDLC SERPL-MCNC: 62 MG/DL (ref 40–75)
HDLC SERPL: 30.7 % (ref 20–50)
HGB BLD-MCNC: 15.1 G/DL (ref 14–18)
IMM GRANULOCYTES # BLD AUTO: 0 K/UL (ref 0–0.04)
IMM GRANULOCYTES NFR BLD AUTO: 0 % (ref 0–0.5)
LDLC SERPL CALC-MCNC: 131 MG/DL (ref 63–159)
LYMPHOCYTES # BLD AUTO: 1.3 K/UL (ref 1–4.8)
LYMPHOCYTES NFR BLD: 46.3 % (ref 18–48)
MCH RBC QN AUTO: 30.6 PG (ref 27–31)
MCHC RBC AUTO-ENTMCNC: 33.1 G/DL (ref 32–36)
MCV RBC AUTO: 93 FL (ref 82–98)
MONOCYTES # BLD AUTO: 0.3 K/UL (ref 0.3–1)
MONOCYTES NFR BLD: 11 % (ref 4–15)
NEUTROPHILS # BLD AUTO: 1 K/UL (ref 1.8–7.7)
NEUTROPHILS NFR BLD: 36 % (ref 38–73)
NONHDLC SERPL-MCNC: 140 MG/DL
NRBC BLD-RTO: 0 /100 WBC
PLATELET # BLD AUTO: 245 K/UL (ref 150–450)
PMV BLD AUTO: 10.5 FL (ref 9.2–12.9)
POTASSIUM SERPL-SCNC: 4.1 MMOL/L (ref 3.5–5.1)
PROT SERPL-MCNC: 6.8 G/DL (ref 6–8.4)
RBC # BLD AUTO: 4.93 M/UL (ref 4.6–6.2)
SODIUM SERPL-SCNC: 140 MMOL/L (ref 136–145)
T4 FREE SERPL-MCNC: 0.76 NG/DL (ref 0.71–1.51)
TRIGL SERPL-MCNC: 45 MG/DL (ref 30–150)
TSH SERPL DL<=0.005 MIU/L-ACNC: 1.58 UIU/ML (ref 0.4–4)
WBC # BLD AUTO: 2.83 K/UL (ref 3.9–12.7)

## 2024-07-09 PROCEDURE — 84443 ASSAY THYROID STIM HORMONE: CPT

## 2024-07-09 PROCEDURE — 80061 LIPID PANEL: CPT

## 2024-07-09 PROCEDURE — 80053 COMPREHEN METABOLIC PANEL: CPT

## 2024-07-09 PROCEDURE — 85025 COMPLETE CBC W/AUTO DIFF WBC: CPT

## 2024-07-09 PROCEDURE — 84439 ASSAY OF FREE THYROXINE: CPT

## 2024-07-09 PROCEDURE — 83036 HEMOGLOBIN GLYCOSYLATED A1C: CPT

## 2024-07-09 PROCEDURE — 36415 COLL VENOUS BLD VENIPUNCTURE: CPT | Mod: PO

## 2025-03-25 ENCOUNTER — OFFICE VISIT (OUTPATIENT)
Dept: FAMILY MEDICINE | Facility: CLINIC | Age: 40
End: 2025-03-25
Payer: COMMERCIAL

## 2025-03-25 VITALS
TEMPERATURE: 98 F | BODY MASS INDEX: 22.84 KG/M2 | OXYGEN SATURATION: 98 % | SYSTOLIC BLOOD PRESSURE: 116 MMHG | HEIGHT: 71 IN | HEART RATE: 50 BPM | DIASTOLIC BLOOD PRESSURE: 74 MMHG | WEIGHT: 163.13 LBS

## 2025-03-25 DIAGNOSIS — M77.12 LEFT LATERAL EPICONDYLITIS: Primary | ICD-10-CM

## 2025-03-25 PROCEDURE — 3008F BODY MASS INDEX DOCD: CPT | Mod: CPTII,S$GLB,, | Performed by: FAMILY MEDICINE

## 2025-03-25 PROCEDURE — 1159F MED LIST DOCD IN RCRD: CPT | Mod: CPTII,S$GLB,, | Performed by: FAMILY MEDICINE

## 2025-03-25 PROCEDURE — 3078F DIAST BP <80 MM HG: CPT | Mod: CPTII,S$GLB,, | Performed by: FAMILY MEDICINE

## 2025-03-25 PROCEDURE — 99999 PR PBB SHADOW E&M-EST. PATIENT-LVL III: CPT | Mod: PBBFAC,,, | Performed by: FAMILY MEDICINE

## 2025-03-25 PROCEDURE — 3074F SYST BP LT 130 MM HG: CPT | Mod: CPTII,S$GLB,, | Performed by: FAMILY MEDICINE

## 2025-03-25 PROCEDURE — 99214 OFFICE O/P EST MOD 30 MIN: CPT | Mod: S$GLB,,, | Performed by: FAMILY MEDICINE

## 2025-03-25 RX ORDER — NAPROXEN 500 MG/1
500 TABLET ORAL 2 TIMES DAILY PRN
Qty: 60 TABLET | Refills: 0 | Status: SHIPPED | OUTPATIENT
Start: 2025-03-25

## 2025-03-25 NOTE — PROGRESS NOTES
[facility]  Progress Note    SUBJECTIVE:     Chief Complaint   Patient presents with    Joint Swelling       HPI   Cale Steiner  is a 39 y.o. male here c/o L elbow pain which started past 3-4 weeks. No falls/trauma. Does work out frequently and does some weight lifting. Rates pain as moderate, on/off. Hasn't tried anything for it. Patient has no other new complaints/problems at this time.      Review of patient's allergies indicates:  No Known Allergies    Past Medical History:   Diagnosis Date    Lumbar spondylosis 11/3/2021     Past Surgical History:   Procedure Laterality Date    EPIDURAL STEROID INJECTION Left 2/23/2022    Procedure: Left L5 + S1 Transforaminal Epidural Steroid Injection;  Surgeon: Ferny Vences Jr., MD;  Location: [facility] ENDO;  Service: Pain Management;  Laterality: Left;  @ 1230  No ATC/DM    EPIDURAL STEROID INJECTION Left 5/18/2022    Procedure: Left L5 + S1 Transforaminal Epidural Steroid Injections;  Surgeon: Ferny Vences Jr., MD;  Location: [facility] ENDO;  Service: Pain Management;  Laterality: Left;  @1215 (requested)  No ATC or DM  Needs Consent    thumb surgery       Family History   Problem Relation Name Age of Onset    Multiple myeloma Mother      No Known Problems Father      No Known Problems Brother      No Known Problems Sister      No Known Problems Maternal Aunt      No Known Problems Maternal Uncle      No Known Problems Paternal Aunt      No Known Problems Paternal Uncle      No Known Problems Maternal Grandmother      No Known Problems Maternal Grandfather      No Known Problems Paternal Grandmother      No Known Problems Paternal Grandfather      Amblyopia Neg Hx      Blindness Neg Hx      Cancer Neg Hx      Cataracts Neg Hx      Diabetes Neg Hx      Glaucoma Neg Hx      Hypertension Neg Hx      Macular degeneration Neg Hx      Retinal detachment Neg Hx      Strabismus Neg Hx      Stroke Neg Hx      Thyroid disease Neg Hx       Social History[1]      Review of Systems   HENT:  Negative for hearing loss.    Eyes:  Negative for discharge.   Respiratory:  Negative for wheezing.    Cardiovascular:  Negative for chest pain and palpitations.   Gastrointestinal:  Negative for blood in stool, constipation, diarrhea and vomiting.   Genitourinary:  Negative for hematuria and urgency.   Musculoskeletal:  Positive for joint pain (L elbow pain). Negative for falls and neck pain.   Neurological:  Negative for weakness and headaches.   Endo/Heme/Allergies:  Negative for polydipsia.     OBJECTIVE:     Vitals:    03/25/25 0807   BP: 116/74   Pulse: (!) 50   Temp: 98 °F (36.7 °C)     Body mass index is 22.75 kg/m².    Physical Exam  Constitutional:       General: He is not in acute distress.     Appearance: He is not diaphoretic.   HENT:      Head: Normocephalic and atraumatic.   Eyes:      Conjunctiva/sclera: Conjunctivae normal.   Pulmonary:      Effort: Pulmonary effort is normal. No respiratory distress.   Musculoskeletal:         General: Tenderness (to L lateral epicondyle. good strength and ROM.) present.   Skin:     General: Skin is warm.   Neurological:      Mental Status: He is alert and oriented to person, place, and time.         Old records were reviewed. Labs and/or images were independently reviewed.    ASSESSMENT     1. Left lateral epicondylitis        PLAN:     Left lateral epicondylitis  -     naproxen (NAPROSYN) 500 MG tablet; Take 1 tablet (500 mg total) by mouth 2 (two) times daily as needed.  Dispense: 60 tablet; Refill: 0  -     Patient counseled on good posture and stretching exercises. Continue to place ice packs on affected areas 3-4 times daily. Take medications as prescribed. Instructed patient to call MD if symptoms persist or worsen.    RTC PRN    Wild Santamaria MD  03/25/2025 8:26 AM           [1]   Social History  Tobacco Use    Smoking status: Never    Smokeless tobacco: Never   Substance Use Topics    Alcohol use: No    Drug use: No